# Patient Record
Sex: MALE | Race: WHITE | Employment: OTHER | URBAN - METROPOLITAN AREA
[De-identification: names, ages, dates, MRNs, and addresses within clinical notes are randomized per-mention and may not be internally consistent; named-entity substitution may affect disease eponyms.]

---

## 2018-12-18 ENCOUNTER — HOSPITAL ENCOUNTER (OUTPATIENT)
Dept: CARDIAC REHAB | Age: 68
Setting detail: THERAPIES SERIES
Discharge: HOME | End: 2018-12-18
Payer: OTHER GOVERNMENT

## 2019-06-04 ENCOUNTER — HOSPITAL ENCOUNTER (OUTPATIENT)
Dept: CARDIAC REHAB | Age: 69
Setting detail: THERAPIES SERIES
Discharge: HOME | End: 2019-06-04
Payer: OTHER GOVERNMENT

## 2019-06-06 ENCOUNTER — HOSPITAL ENCOUNTER (OUTPATIENT)
Dept: CARDIAC REHAB | Age: 69
Setting detail: THERAPIES SERIES
Discharge: HOME | End: 2019-06-06
Payer: OTHER GOVERNMENT

## 2019-06-06 PROCEDURE — G0424 PULMONARY REHAB W EXER: HCPCS

## 2019-06-11 ENCOUNTER — HOSPITAL ENCOUNTER (OUTPATIENT)
Dept: CARDIAC REHAB | Age: 69
Setting detail: THERAPIES SERIES
Discharge: HOME | End: 2019-06-11
Payer: OTHER GOVERNMENT

## 2019-06-11 PROCEDURE — G0424 PULMONARY REHAB W EXER: HCPCS

## 2019-06-13 ENCOUNTER — HOSPITAL ENCOUNTER (OUTPATIENT)
Dept: CARDIAC REHAB | Age: 69
Setting detail: THERAPIES SERIES
Discharge: HOME | End: 2019-06-13
Payer: OTHER GOVERNMENT

## 2019-06-13 PROCEDURE — G0424 PULMONARY REHAB W EXER: HCPCS

## 2019-06-18 ENCOUNTER — HOSPITAL ENCOUNTER (OUTPATIENT)
Dept: CARDIAC REHAB | Age: 69
Setting detail: THERAPIES SERIES
End: 2019-06-18
Payer: OTHER GOVERNMENT

## 2019-06-20 ENCOUNTER — HOSPITAL ENCOUNTER (OUTPATIENT)
Dept: CARDIAC REHAB | Age: 69
Setting detail: THERAPIES SERIES
Discharge: HOME | End: 2019-06-20
Payer: OTHER GOVERNMENT

## 2019-06-20 PROCEDURE — G0424 PULMONARY REHAB W EXER: HCPCS

## 2019-06-25 ENCOUNTER — HOSPITAL ENCOUNTER (OUTPATIENT)
Dept: CARDIAC REHAB | Age: 69
Setting detail: THERAPIES SERIES
Discharge: HOME | End: 2019-06-25
Payer: OTHER GOVERNMENT

## 2019-06-25 PROCEDURE — G0424 PULMONARY REHAB W EXER: HCPCS

## 2019-07-01 ENCOUNTER — APPOINTMENT (OUTPATIENT)
Dept: CT IMAGING | Age: 69
DRG: 439 | End: 2019-07-01
Payer: OTHER GOVERNMENT

## 2019-07-01 ENCOUNTER — HOSPITAL ENCOUNTER (INPATIENT)
Age: 69
LOS: 9 days | Discharge: ANOTHER HOSPITAL | DRG: 439 | End: 2019-07-10
Attending: EMERGENCY MEDICINE | Admitting: INTERNAL MEDICINE
Payer: OTHER GOVERNMENT

## 2019-07-01 DIAGNOSIS — K52.9 COLITIS: ICD-10-CM

## 2019-07-01 DIAGNOSIS — K29.80 DUODENITIS: ICD-10-CM

## 2019-07-01 DIAGNOSIS — K85.90 ACUTE PANCREATITIS, UNSPECIFIED COMPLICATION STATUS, UNSPECIFIED PANCREATITIS TYPE: Primary | ICD-10-CM

## 2019-07-01 PROBLEM — K85.91 ACUTE NECROTIZING PANCREATITIS: Status: ACTIVE | Noted: 2019-07-01

## 2019-07-01 LAB
ALBUMIN SERPL-MCNC: 3.8 G/DL (ref 3.5–5.2)
ALP BLD-CCNC: 85 U/L (ref 40–129)
ALT SERPL-CCNC: 34 U/L (ref 0–40)
ANION GAP SERPL CALCULATED.3IONS-SCNC: 14 MMOL/L (ref 7–16)
AST SERPL-CCNC: 81 U/L (ref 0–39)
BASOPHILS ABSOLUTE: 0 E9/L (ref 0–0.2)
BASOPHILS RELATIVE PERCENT: 0.2 % (ref 0–2)
BILIRUB SERPL-MCNC: 1.6 MG/DL (ref 0–1.2)
BUN BLDV-MCNC: 12 MG/DL (ref 8–23)
BURR CELLS: ABNORMAL
CALCIUM SERPL-MCNC: 10 MG/DL (ref 8.6–10.2)
CHLORIDE BLD-SCNC: 92 MMOL/L (ref 98–107)
CO2: 25 MMOL/L (ref 22–29)
CREAT SERPL-MCNC: 0.7 MG/DL (ref 0.7–1.2)
EOSINOPHILS ABSOLUTE: 0 E9/L (ref 0.05–0.5)
EOSINOPHILS RELATIVE PERCENT: 0.9 % (ref 0–6)
GFR AFRICAN AMERICAN: >60
GFR NON-AFRICAN AMERICAN: >60 ML/MIN/1.73
GLUCOSE BLD-MCNC: 112 MG/DL (ref 74–99)
HCT VFR BLD CALC: 42.3 % (ref 37–54)
HEMOGLOBIN: 13.6 G/DL (ref 12.5–16.5)
LACTIC ACID: 1.2 MMOL/L (ref 0.5–2.2)
LIPASE: 192 U/L (ref 13–60)
LYMPHOCYTES ABSOLUTE: 0.7 E9/L (ref 1.5–4)
LYMPHOCYTES RELATIVE PERCENT: 4.3 % (ref 20–42)
MCH RBC QN AUTO: 27.9 PG (ref 26–35)
MCHC RBC AUTO-ENTMCNC: 32.2 % (ref 32–34.5)
MCV RBC AUTO: 86.9 FL (ref 80–99.9)
MONOCYTES ABSOLUTE: 1.76 E9/L (ref 0.1–0.95)
MONOCYTES RELATIVE PERCENT: 9.6 % (ref 2–12)
NEUTROPHILS ABSOLUTE: 15.14 E9/L (ref 1.8–7.3)
NEUTROPHILS RELATIVE PERCENT: 86.1 % (ref 43–80)
OVALOCYTES: ABNORMAL
PDW BLD-RTO: 15.4 FL (ref 11.5–15)
PLATELET # BLD: 178 E9/L (ref 130–450)
PMV BLD AUTO: 9.7 FL (ref 7–12)
POIKILOCYTES: ABNORMAL
POTASSIUM REFLEX MAGNESIUM: 3.6 MMOL/L (ref 3.5–5)
RBC # BLD: 4.87 E12/L (ref 3.8–5.8)
SCHISTOCYTES: ABNORMAL
SODIUM BLD-SCNC: 131 MMOL/L (ref 132–146)
TEAR DROP CELLS: ABNORMAL
TOTAL PROTEIN: 7.6 G/DL (ref 6.4–8.3)
TROPONIN: <0.01 NG/ML (ref 0–0.03)
WBC # BLD: 17.6 E9/L (ref 4.5–11.5)

## 2019-07-01 PROCEDURE — 2060000000 HC ICU INTERMEDIATE R&B

## 2019-07-01 PROCEDURE — 99285 EMERGENCY DEPT VISIT HI MDM: CPT

## 2019-07-01 PROCEDURE — 6360000002 HC RX W HCPCS: Performed by: INTERNAL MEDICINE

## 2019-07-01 PROCEDURE — C9113 INJ PANTOPRAZOLE SODIUM, VIA: HCPCS | Performed by: INTERNAL MEDICINE

## 2019-07-01 PROCEDURE — 84484 ASSAY OF TROPONIN QUANT: CPT

## 2019-07-01 PROCEDURE — 96375 TX/PRO/DX INJ NEW DRUG ADDON: CPT

## 2019-07-01 PROCEDURE — 6360000002 HC RX W HCPCS: Performed by: EMERGENCY MEDICINE

## 2019-07-01 PROCEDURE — 6360000004 HC RX CONTRAST MEDICATION: Performed by: RADIOLOGY

## 2019-07-01 PROCEDURE — 94640 AIRWAY INHALATION TREATMENT: CPT

## 2019-07-01 PROCEDURE — 6370000000 HC RX 637 (ALT 250 FOR IP): Performed by: INTERNAL MEDICINE

## 2019-07-01 PROCEDURE — 83605 ASSAY OF LACTIC ACID: CPT

## 2019-07-01 PROCEDURE — 74177 CT ABD & PELVIS W/CONTRAST: CPT

## 2019-07-01 PROCEDURE — 96374 THER/PROPH/DIAG INJ IV PUSH: CPT

## 2019-07-01 PROCEDURE — 80053 COMPREHEN METABOLIC PANEL: CPT

## 2019-07-01 PROCEDURE — 36415 COLL VENOUS BLD VENIPUNCTURE: CPT

## 2019-07-01 PROCEDURE — 83690 ASSAY OF LIPASE: CPT

## 2019-07-01 PROCEDURE — 85025 COMPLETE CBC W/AUTO DIFF WBC: CPT

## 2019-07-01 PROCEDURE — 2580000003 HC RX 258: Performed by: EMERGENCY MEDICINE

## 2019-07-01 RX ORDER — ASPIRIN 81 MG/1
81 TABLET, CHEWABLE ORAL DAILY
Status: DISCONTINUED | OUTPATIENT
Start: 2019-07-02 | End: 2019-07-10 | Stop reason: HOSPADM

## 2019-07-01 RX ORDER — KETOROLAC TROMETHAMINE 15 MG/ML
15 INJECTION, SOLUTION INTRAMUSCULAR; INTRAVENOUS ONCE
Status: COMPLETED | OUTPATIENT
Start: 2019-07-01 | End: 2019-07-01

## 2019-07-01 RX ORDER — FENOFIBRATE 160 MG/1
160 TABLET ORAL DAILY
Status: DISCONTINUED | OUTPATIENT
Start: 2019-07-02 | End: 2019-07-10 | Stop reason: HOSPADM

## 2019-07-01 RX ORDER — ACETAMINOPHEN 325 MG/1
650 TABLET ORAL 4 TIMES DAILY PRN
COMMUNITY
Start: 2019-03-07

## 2019-07-01 RX ORDER — CHOLESTYRAMINE LIGHT 4 G/5.7G
4 POWDER, FOR SUSPENSION ORAL DAILY
Status: DISCONTINUED | OUTPATIENT
Start: 2019-07-02 | End: 2019-07-10 | Stop reason: HOSPADM

## 2019-07-01 RX ORDER — MORPHINE SULFATE 10 MG/ML
8 INJECTION, SOLUTION INTRAMUSCULAR; INTRAVENOUS ONCE
Status: COMPLETED | OUTPATIENT
Start: 2019-07-01 | End: 2019-07-01

## 2019-07-01 RX ORDER — SODIUM CHLORIDE AND POTASSIUM CHLORIDE .9; .15 G/100ML; G/100ML
SOLUTION INTRAVENOUS CONTINUOUS
Status: DISCONTINUED | OUTPATIENT
Start: 2019-07-01 | End: 2019-07-10 | Stop reason: HOSPADM

## 2019-07-01 RX ORDER — BUDESONIDE AND FORMOTEROL FUMARATE DIHYDRATE 160; 4.5 UG/1; UG/1
2 AEROSOL RESPIRATORY (INHALATION) 2 TIMES DAILY
COMMUNITY
Start: 2019-03-07

## 2019-07-01 RX ORDER — CARVEDILOL 25 MG/1
25 TABLET ORAL 2 TIMES DAILY WITH MEALS
Status: DISCONTINUED | OUTPATIENT
Start: 2019-07-02 | End: 2019-07-10 | Stop reason: HOSPADM

## 2019-07-01 RX ORDER — IPRATROPIUM BROMIDE AND ALBUTEROL SULFATE 2.5; .5 MG/3ML; MG/3ML
1 SOLUTION RESPIRATORY (INHALATION)
Status: DISCONTINUED | OUTPATIENT
Start: 2019-07-02 | End: 2019-07-10 | Stop reason: HOSPADM

## 2019-07-01 RX ORDER — MORPHINE SULFATE 4 MG/ML
4 INJECTION, SOLUTION INTRAMUSCULAR; INTRAVENOUS EVERY 4 HOURS PRN
Status: DISCONTINUED | OUTPATIENT
Start: 2019-07-01 | End: 2019-07-10 | Stop reason: HOSPADM

## 2019-07-01 RX ORDER — PANTOPRAZOLE SODIUM 40 MG/10ML
40 INJECTION, POWDER, LYOPHILIZED, FOR SOLUTION INTRAVENOUS 2 TIMES DAILY
Status: DISCONTINUED | OUTPATIENT
Start: 2019-07-01 | End: 2019-07-09

## 2019-07-01 RX ORDER — ONDANSETRON 2 MG/ML
4 INJECTION INTRAMUSCULAR; INTRAVENOUS ONCE
Status: COMPLETED | OUTPATIENT
Start: 2019-07-01 | End: 2019-07-01

## 2019-07-01 RX ORDER — PRAVASTATIN SODIUM 20 MG
80 TABLET ORAL DAILY
Status: DISCONTINUED | OUTPATIENT
Start: 2019-07-02 | End: 2019-07-08

## 2019-07-01 RX ORDER — ONDANSETRON 2 MG/ML
4 INJECTION INTRAMUSCULAR; INTRAVENOUS EVERY 6 HOURS PRN
Status: DISCONTINUED | OUTPATIENT
Start: 2019-07-01 | End: 2019-07-10 | Stop reason: HOSPADM

## 2019-07-01 RX ORDER — 0.9 % SODIUM CHLORIDE 0.9 %
1000 INTRAVENOUS SOLUTION INTRAVENOUS ONCE
Status: COMPLETED | OUTPATIENT
Start: 2019-07-01 | End: 2019-07-01

## 2019-07-01 RX ORDER — M-VIT,TX,IRON,MINS/CALC/FOLIC 27MG-0.4MG
1 TABLET ORAL DAILY
Status: DISCONTINUED | OUTPATIENT
Start: 2019-07-02 | End: 2019-07-10 | Stop reason: HOSPADM

## 2019-07-01 RX ORDER — TRAMADOL HYDROCHLORIDE 50 MG/1
50 TABLET ORAL 2 TIMES DAILY
Status: DISCONTINUED | OUTPATIENT
Start: 2019-07-01 | End: 2019-07-10 | Stop reason: HOSPADM

## 2019-07-01 RX ADMIN — IOHEXOL 50 ML: 240 INJECTION, SOLUTION INTRATHECAL; INTRAVASCULAR; INTRAVENOUS; ORAL at 20:36

## 2019-07-01 RX ADMIN — MORPHINE SULFATE 8 MG: 10 INJECTION INTRAVENOUS at 21:08

## 2019-07-01 RX ADMIN — PANTOPRAZOLE SODIUM 40 MG: 40 INJECTION, POWDER, FOR SOLUTION INTRAVENOUS at 23:35

## 2019-07-01 RX ADMIN — KETOROLAC TROMETHAMINE 15 MG: 15 INJECTION, SOLUTION INTRAMUSCULAR; INTRAVENOUS at 18:54

## 2019-07-01 RX ADMIN — PIPERACILLIN SODIUM AND TAZOBACTAM SODIUM 3.38 G: 3; .375 INJECTION, POWDER, LYOPHILIZED, FOR SOLUTION INTRAVENOUS at 21:43

## 2019-07-01 RX ADMIN — ONDANSETRON 4 MG: 2 INJECTION INTRAMUSCULAR; INTRAVENOUS at 18:55

## 2019-07-01 RX ADMIN — IPRATROPIUM BROMIDE AND ALBUTEROL SULFATE 1 AMPULE: .5; 3 SOLUTION RESPIRATORY (INHALATION) at 22:57

## 2019-07-01 RX ADMIN — IOPAMIDOL 80 ML: 755 INJECTION, SOLUTION INTRAVENOUS at 20:36

## 2019-07-01 RX ADMIN — POTASSIUM CHLORIDE AND SODIUM CHLORIDE: 900; 150 INJECTION, SOLUTION INTRAVENOUS at 23:35

## 2019-07-01 RX ADMIN — SODIUM CHLORIDE 1000 ML: 900 INJECTION, SOLUTION INTRAVENOUS at 18:57

## 2019-07-01 ASSESSMENT — PAIN DESCRIPTION - DESCRIPTORS: DESCRIPTORS: THROBBING;PRESSURE

## 2019-07-01 ASSESSMENT — PAIN SCALES - GENERAL
PAINLEVEL_OUTOF10: 10
PAINLEVEL_OUTOF10: 0
PAINLEVEL_OUTOF10: 9
PAINLEVEL_OUTOF10: 0
PAINLEVEL_OUTOF10: 7

## 2019-07-01 ASSESSMENT — ENCOUNTER SYMPTOMS
ABDOMINAL DISTENTION: 1
BLOOD IN STOOL: 0
NAUSEA: 1
DIARRHEA: 1
BACK PAIN: 0
ABDOMINAL PAIN: 1
SHORTNESS OF BREATH: 0
VOMITING: 1
CONSTIPATION: 1

## 2019-07-01 ASSESSMENT — PAIN DESCRIPTION - LOCATION: LOCATION: ABDOMEN

## 2019-07-01 ASSESSMENT — PAIN DESCRIPTION - PAIN TYPE: TYPE: ACUTE PAIN

## 2019-07-01 NOTE — ED PROVIDER NOTES
0.50 E9/L    Basophils # 0.00 0.00 - 0.20 E9/L    Poikilocytes 2+     Schistocytes 1+     Palmetto Cells 2+     Ovalocytes 1+     Tear Drop Cells 1+    Comprehensive Metabolic Panel w/ Reflex to MG   Result Value Ref Range    Sodium 131 (L) 132 - 146 mmol/L    Potassium reflex Magnesium 3.6 3.5 - 5.0 mmol/L    Chloride 92 (L) 98 - 107 mmol/L    CO2 25 22 - 29 mmol/L    Anion Gap 14 7 - 16 mmol/L    Glucose 112 (H) 74 - 99 mg/dL    BUN 12 8 - 23 mg/dL    CREATININE 0.7 0.7 - 1.2 mg/dL    GFR Non-African American >60 >=60 mL/min/1.73    GFR African American >60     Calcium 10.0 8.6 - 10.2 mg/dL    Total Protein 7.6 6.4 - 8.3 g/dL    Alb 3.8 3.5 - 5.2 g/dL    Total Bilirubin 1.6 (H) 0.0 - 1.2 mg/dL    Alkaline Phosphatase 85 40 - 129 U/L    ALT 34 0 - 40 U/L    AST 81 (H) 0 - 39 U/L   Lipase   Result Value Ref Range    Lipase 192 (H) 13 - 60 U/L   Lactic Acid, Plasma   Result Value Ref Range    Lactic Acid 1.2 0.5 - 2.2 mmol/L   Troponin   Result Value Ref Range    Troponin <0.01 0.00 - 0.03 ng/mL       RADIOLOGY:  CT ABDOMEN PELVIS W IV CONTRAST Additional Contrast? Oral   Final Result   Extensive inflammatory process in the upper abdomen. Differential includes pancreatitis and duodenitis. Air bubbles within   the region of the abnormal appearing duodenum could be intraluminal or   intramural in location. Inflammatory process is also associated with   some mural thickening of the ascending portion of the colon. ------------------------- NURSING NOTES AND VITALS REVIEWED ---------------------------  Date / Time Roomed:  7/1/2019  5:52 PM  ED Bed Assignment:  [id number]    The nursing notes within the ED encounter and vital signs as below have been reviewed.      Patient Vitals for the past 24 hrs:   BP Temp Temp src Pulse Resp SpO2 Height Weight   07/01/19 2239 123/67 99 °F (37.2 °C) Oral 84 20 92 % -- --   07/01/19 2200 96/63 98.2 °F (36.8 °C) Oral 84 20 92 % -- --   07/01/19 2000 112/72 -- -- 78 20 93 % --

## 2019-07-02 LAB
ALBUMIN SERPL-MCNC: 3.3 G/DL (ref 3.5–5.2)
ALP BLD-CCNC: 83 U/L (ref 40–129)
ALT SERPL-CCNC: 35 U/L (ref 0–40)
ANION GAP SERPL CALCULATED.3IONS-SCNC: 12 MMOL/L (ref 7–16)
ANISOCYTOSIS: ABNORMAL
AST SERPL-CCNC: 72 U/L (ref 0–39)
BASOPHILS ABSOLUTE: 0 E9/L (ref 0–0.2)
BASOPHILS RELATIVE PERCENT: 0.3 % (ref 0–2)
BILIRUB SERPL-MCNC: 1.3 MG/DL (ref 0–1.2)
BUN BLDV-MCNC: 13 MG/DL (ref 8–23)
BURR CELLS: ABNORMAL
CALCIUM SERPL-MCNC: 9 MG/DL (ref 8.6–10.2)
CHLORIDE BLD-SCNC: 98 MMOL/L (ref 98–107)
CHOLESTEROL, TOTAL: 120 MG/DL (ref 0–199)
CO2: 23 MMOL/L (ref 22–29)
CREAT SERPL-MCNC: 0.8 MG/DL (ref 0.7–1.2)
EOSINOPHILS ABSOLUTE: 0 E9/L (ref 0.05–0.5)
EOSINOPHILS RELATIVE PERCENT: 1.5 % (ref 0–6)
GFR AFRICAN AMERICAN: >60
GFR NON-AFRICAN AMERICAN: >60 ML/MIN/1.73
GLUCOSE BLD-MCNC: 81 MG/DL (ref 74–99)
HCT VFR BLD CALC: 36.4 % (ref 37–54)
HDLC SERPL-MCNC: 25 MG/DL
HEMOGLOBIN: 11.7 G/DL (ref 12.5–16.5)
LDL CHOLESTEROL CALCULATED: 72 MG/DL (ref 0–99)
LYMPHOCYTES ABSOLUTE: 0.29 E9/L (ref 1.5–4)
LYMPHOCYTES RELATIVE PERCENT: 1.7 % (ref 20–42)
MAGNESIUM: 1.7 MG/DL (ref 1.6–2.6)
MCH RBC QN AUTO: 28.1 PG (ref 26–35)
MCHC RBC AUTO-ENTMCNC: 32.1 % (ref 32–34.5)
MCV RBC AUTO: 87.5 FL (ref 80–99.9)
MONOCYTES ABSOLUTE: 1.47 E9/L (ref 0.1–0.95)
MONOCYTES RELATIVE PERCENT: 9.6 % (ref 2–12)
NEUTROPHILS ABSOLUTE: 13.08 E9/L (ref 1.8–7.3)
NEUTROPHILS RELATIVE PERCENT: 88.7 % (ref 43–80)
OVALOCYTES: ABNORMAL
PDW BLD-RTO: 15.3 FL (ref 11.5–15)
PHOSPHORUS: 2.5 MG/DL (ref 2.5–4.5)
PLATELET # BLD: 162 E9/L (ref 130–450)
PMV BLD AUTO: 9.6 FL (ref 7–12)
POIKILOCYTES: ABNORMAL
POLYCHROMASIA: ABNORMAL
POTASSIUM SERPL-SCNC: 3.6 MMOL/L (ref 3.5–5)
RBC # BLD: 4.16 E12/L (ref 3.8–5.8)
SODIUM BLD-SCNC: 133 MMOL/L (ref 132–146)
TEAR DROP CELLS: ABNORMAL
TOTAL PROTEIN: 6.7 G/DL (ref 6.4–8.3)
TRIGL SERPL-MCNC: 116 MG/DL (ref 0–149)
TSH SERPL DL<=0.05 MIU/L-ACNC: 1.83 UIU/ML (ref 0.27–4.2)
VLDLC SERPL CALC-MCNC: 23 MG/DL
WBC # BLD: 14.7 E9/L (ref 4.5–11.5)

## 2019-07-02 PROCEDURE — 84100 ASSAY OF PHOSPHORUS: CPT

## 2019-07-02 PROCEDURE — 80053 COMPREHEN METABOLIC PANEL: CPT

## 2019-07-02 PROCEDURE — 36415 COLL VENOUS BLD VENIPUNCTURE: CPT

## 2019-07-02 PROCEDURE — 83735 ASSAY OF MAGNESIUM: CPT

## 2019-07-02 PROCEDURE — 2060000000 HC ICU INTERMEDIATE R&B

## 2019-07-02 PROCEDURE — 80061 LIPID PANEL: CPT

## 2019-07-02 PROCEDURE — 94640 AIRWAY INHALATION TREATMENT: CPT

## 2019-07-02 PROCEDURE — 84443 ASSAY THYROID STIM HORMONE: CPT

## 2019-07-02 PROCEDURE — 6370000000 HC RX 637 (ALT 250 FOR IP): Performed by: INTERNAL MEDICINE

## 2019-07-02 PROCEDURE — C9113 INJ PANTOPRAZOLE SODIUM, VIA: HCPCS | Performed by: INTERNAL MEDICINE

## 2019-07-02 PROCEDURE — 85025 COMPLETE CBC W/AUTO DIFF WBC: CPT

## 2019-07-02 PROCEDURE — 2700000000 HC OXYGEN THERAPY PER DAY

## 2019-07-02 PROCEDURE — 6360000002 HC RX W HCPCS: Performed by: INTERNAL MEDICINE

## 2019-07-02 RX ORDER — ACETAMINOPHEN 500 MG
500 TABLET ORAL EVERY 6 HOURS PRN
Status: DISCONTINUED | OUTPATIENT
Start: 2019-07-02 | End: 2019-07-06

## 2019-07-02 RX ADMIN — ONDANSETRON 4 MG: 2 INJECTION INTRAMUSCULAR; INTRAVENOUS at 22:11

## 2019-07-02 RX ADMIN — MORPHINE SULFATE 4 MG: 4 INJECTION INTRAVENOUS at 00:20

## 2019-07-02 RX ADMIN — PANTOPRAZOLE SODIUM 40 MG: 40 INJECTION, POWDER, FOR SOLUTION INTRAVENOUS at 20:26

## 2019-07-02 RX ADMIN — IPRATROPIUM BROMIDE AND ALBUTEROL SULFATE 1 AMPULE: .5; 3 SOLUTION RESPIRATORY (INHALATION) at 06:18

## 2019-07-02 RX ADMIN — IPRATROPIUM BROMIDE AND ALBUTEROL SULFATE 1 AMPULE: .5; 3 SOLUTION RESPIRATORY (INHALATION) at 09:19

## 2019-07-02 RX ADMIN — POTASSIUM CHLORIDE AND SODIUM CHLORIDE: 900; 150 INJECTION, SOLUTION INTRAVENOUS at 09:27

## 2019-07-02 RX ADMIN — IPRATROPIUM BROMIDE AND ALBUTEROL SULFATE 1 AMPULE: .5; 3 SOLUTION RESPIRATORY (INHALATION) at 18:07

## 2019-07-02 RX ADMIN — ONDANSETRON 4 MG: 2 INJECTION INTRAMUSCULAR; INTRAVENOUS at 09:27

## 2019-07-02 RX ADMIN — ONDANSETRON 4 MG: 2 INJECTION INTRAMUSCULAR; INTRAVENOUS at 03:01

## 2019-07-02 RX ADMIN — IPRATROPIUM BROMIDE AND ALBUTEROL SULFATE 1 AMPULE: .5; 3 SOLUTION RESPIRATORY (INHALATION) at 13:24

## 2019-07-02 RX ADMIN — POTASSIUM CHLORIDE AND SODIUM CHLORIDE: 900; 150 INJECTION, SOLUTION INTRAVENOUS at 18:17

## 2019-07-02 RX ADMIN — MORPHINE SULFATE 4 MG: 4 INJECTION INTRAVENOUS at 04:53

## 2019-07-02 RX ADMIN — MORPHINE SULFATE 4 MG: 4 INJECTION INTRAVENOUS at 20:26

## 2019-07-02 RX ADMIN — ONDANSETRON 4 MG: 2 INJECTION INTRAMUSCULAR; INTRAVENOUS at 15:51

## 2019-07-02 RX ADMIN — PANTOPRAZOLE SODIUM 40 MG: 40 INJECTION, POWDER, FOR SOLUTION INTRAVENOUS at 09:27

## 2019-07-02 RX ADMIN — MORPHINE SULFATE 4 MG: 4 INJECTION INTRAVENOUS at 15:51

## 2019-07-02 RX ADMIN — MORPHINE SULFATE 4 MG: 4 INJECTION INTRAVENOUS at 09:27

## 2019-07-02 ASSESSMENT — PAIN DESCRIPTION - PAIN TYPE
TYPE: ACUTE PAIN

## 2019-07-02 ASSESSMENT — PAIN SCALES - GENERAL
PAINLEVEL_OUTOF10: 8
PAINLEVEL_OUTOF10: 8
PAINLEVEL_OUTOF10: 9
PAINLEVEL_OUTOF10: 8
PAINLEVEL_OUTOF10: 9
PAINLEVEL_OUTOF10: 0

## 2019-07-02 ASSESSMENT — PAIN DESCRIPTION - ORIENTATION: ORIENTATION: UPPER

## 2019-07-02 ASSESSMENT — PAIN DESCRIPTION - DESCRIPTORS
DESCRIPTORS: DISCOMFORT;THROBBING
DESCRIPTORS: PENETRATING;DISCOMFORT
DESCRIPTORS: ACHING;DISCOMFORT

## 2019-07-02 ASSESSMENT — PAIN DESCRIPTION - LOCATION
LOCATION: ABDOMEN;BACK
LOCATION: ABDOMEN
LOCATION: ABDOMEN
LOCATION: ABDOMEN;BACK
LOCATION: ABDOMEN

## 2019-07-02 ASSESSMENT — PAIN - FUNCTIONAL ASSESSMENT
PAIN_FUNCTIONAL_ASSESSMENT: PREVENTS OR INTERFERES SOME ACTIVE ACTIVITIES AND ADLS
PAIN_FUNCTIONAL_ASSESSMENT: ACTIVITIES ARE NOT PREVENTED
PAIN_FUNCTIONAL_ASSESSMENT: ACTIVITIES ARE NOT PREVENTED

## 2019-07-02 NOTE — PROGRESS NOTES
Perfect served Aleshia Bolanos because he is on call for Manny Kern about the new consult. Aleshia Bolanos did read the message.

## 2019-07-02 NOTE — H&P
on phone: Not on file     Gets together: Not on file     Attends Sabianism service: Not on file     Active member of club or organization: Not on file     Attends meetings of clubs or organizations: Not on file     Relationship status: Not on file    Intimate partner violence:     Fear of current or ex partner: Not on file     Emotionally abused: Not on file     Physically abused: Not on file     Forced sexual activity: Not on file   Other Topics Concern    Not on file   Social History Narrative    Not on file       Family History:   No family history on file. REVIEW OF SYSTEMS:    Gen: Patient denies any lightheadedness or dizziness. No LOC or syncope. No fevers or chills. HEENT: No earache, sore throat or nasal congestion. Resp: Denies cough, hemoptysis or sputum production. Cardiac: Denies chest pain, SOB, diaphoresis or palpitations. GI: + Upper abdominal pain, nausea, vomiting, diarrhea, constipation. No melena or hematochezia. : No urinary complaints, dysuria, hematuria or frequency. MSK: No extremity weakness, paralysis or paresthesias. PHYSICAL EXAM:    Vitals:  /68   Pulse 91   Temp 98.6 °F (37 °C) (Axillary)   Resp 18   Ht 5' 11\" (1.803 m)   Wt 207 lb 6 oz (94.1 kg)   SpO2 96%   BMI 28.92 kg/m²     General:  This is a 76 y.o. yo male who is alert and oriented in NAD  HEENT:  Head is normocephalic and atraumatic, PERRLA, EOMI, mucus membranes moist with no pharyngeal erythema or exudate. Neck:  Supple with no carotid bruits, JVD or thyromegaly.   No cervical adenopathy  CV:  Regular rate and rhythm, no murmurs  Lungs: Coarse decreased breath sound with mild expiratory wheeze to auscultation bilaterally with no wheezes, rales or rhonchi  Abdomen:  Soft, + mid upper abdomen tender, only distended, bowel sounds present, no rebound tenderness  Extremities:  No edema, peripheral pulses intact bilaterally  Neuro:  Cranial nerves II-XII grossly intact; motor and sensory function intact with no focal deficits  Skin:  No rashes, lesions or wounds    DATA:  CBC with Differential:    Lab Results   Component Value Date    WBC 14.7 07/02/2019    RBC 4.16 07/02/2019    HGB 11.7 07/02/2019    HCT 36.4 07/02/2019     07/02/2019    MCV 87.5 07/02/2019    MCH 28.1 07/02/2019    MCHC 32.1 07/02/2019    RDW 15.3 07/02/2019    LYMPHOPCT 1.7 07/02/2019    MONOPCT 9.6 07/02/2019    BASOPCT 0.3 07/02/2019    MONOSABS 1.47 07/02/2019    LYMPHSABS 0.29 07/02/2019    EOSABS 0.00 07/02/2019    BASOSABS 0.00 07/02/2019     CMP:    Lab Results   Component Value Date     07/02/2019    K 3.6 07/02/2019    K 3.6 07/01/2019    CL 98 07/02/2019    CO2 23 07/02/2019    BUN 13 07/02/2019    CREATININE 0.8 07/02/2019    GFRAA >60 07/02/2019    LABGLOM >60 07/02/2019    GLUCOSE 81 07/02/2019    PROT 6.7 07/02/2019    LABALBU 3.3 07/02/2019    CALCIUM 9.0 07/02/2019    BILITOT 1.3 07/02/2019    ALKPHOS 83 07/02/2019    AST 72 07/02/2019    ALT 35 07/02/2019     Magnesium:    Lab Results   Component Value Date    MG 1.7 07/02/2019     Phosphorus:    Lab Results   Component Value Date    PHOS 2.5 07/02/2019     PT/INR:  No results found for: PROTIME, INR  Troponin:    Lab Results   Component Value Date    TROPONINI <0.01 07/01/2019     U/A:  No results found for: NITRITE, COLORU, PROTEINU, PHUR, LABCAST, WBCUA, RBCUA, MUCUS, TRICHOMONAS, YEAST, BACTERIA, CLARITYU, SPECGRAV, LEUKOCYTESUR, UROBILINOGEN, BILIRUBINUR, BLOODU, GLUCOSEU, AMORPHOUS  ABG:  No results found for: PH, PCO2, PO2, HCO3, BE, THGB, TCO2, O2SAT  HgBA1c:  No results found for: LABA1C  FLP:    Lab Results   Component Value Date    TRIG 116 07/02/2019    HDL 25 07/02/2019    LDLCALC 72 07/02/2019    LABVLDL 23 07/02/2019     TSH:    Lab Results   Component Value Date    TSH 1.830 07/02/2019     IRON:  No results found for: IRON  LIPASE:    Lab Results   Component Value Date    LIPASE 192 07/01/2019       ASSESSMENT AND PLAN: Patient Active Problem List    Diagnosis Date Noted    Acute pancreatitis 07/01/2019    Combined 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 except for ice chips and medication  Consult general surgery  MRCP if no improvement  Lipase, CMP and CBC in a.m.          Anushka Almendarez D.O.  7/2/2019  9:51 AM

## 2019-07-02 NOTE — CARE COORDINATION
Patient is a - per [facility] he is service connected and has travel benefits. Await return call from [facility] to determine if they would be able to take the patient and if a bed is available. Spoke with the patient and he is willing to transfer to [address] [place] [address] if possible and if a bed is available; he especially would rather transfer if he would need a lot more testing, treatment, or surgery.

## 2019-07-02 NOTE — ED NOTES
Nurse to nurse given to [facility] from [facility], the floor will be down shortly to get the patient.      Karl Mejia RN  07/01/19 4538

## 2019-07-03 ENCOUNTER — APPOINTMENT (OUTPATIENT)
Dept: MRI IMAGING | Age: 69
DRG: 439 | End: 2019-07-03
Payer: OTHER GOVERNMENT

## 2019-07-03 LAB
ALBUMIN SERPL-MCNC: 2.8 G/DL (ref 3.5–5.2)
ALP BLD-CCNC: 103 U/L (ref 40–129)
ALT SERPL-CCNC: 35 U/L (ref 0–40)
ANION GAP SERPL CALCULATED.3IONS-SCNC: 15 MMOL/L (ref 7–16)
AST SERPL-CCNC: 72 U/L (ref 0–39)
BASOPHILS ABSOLUTE: 0 E9/L (ref 0–0.2)
BASOPHILS RELATIVE PERCENT: 0 % (ref 0–2)
BILIRUB SERPL-MCNC: 1.2 MG/DL (ref 0–1.2)
BUN BLDV-MCNC: 9 MG/DL (ref 8–23)
CALCIUM SERPL-MCNC: 9.2 MG/DL (ref 8.6–10.2)
CHLORIDE BLD-SCNC: 97 MMOL/L (ref 98–107)
CO2: 22 MMOL/L (ref 22–29)
CREAT SERPL-MCNC: 0.7 MG/DL (ref 0.7–1.2)
EOSINOPHILS ABSOLUTE: 0.17 E9/L (ref 0.05–0.5)
EOSINOPHILS RELATIVE PERCENT: 1 % (ref 0–6)
GFR AFRICAN AMERICAN: >60
GFR NON-AFRICAN AMERICAN: >60 ML/MIN/1.73
GLUCOSE BLD-MCNC: 70 MG/DL (ref 74–99)
HCT VFR BLD CALC: 38.5 % (ref 37–54)
HEMOGLOBIN: 11.7 G/DL (ref 12.5–16.5)
LIPASE: 84 U/L (ref 13–60)
LYMPHOCYTES ABSOLUTE: 0.52 E9/L (ref 1.5–4)
LYMPHOCYTES RELATIVE PERCENT: 3 % (ref 20–42)
MCH RBC QN AUTO: 27.9 PG (ref 26–35)
MCHC RBC AUTO-ENTMCNC: 30.4 % (ref 32–34.5)
MCV RBC AUTO: 91.9 FL (ref 80–99.9)
MONOCYTES ABSOLUTE: 1.56 E9/L (ref 0.1–0.95)
MONOCYTES RELATIVE PERCENT: 9 % (ref 2–12)
MYELOCYTE PERCENT: 1 % (ref 0–0)
NEUTROPHILS ABSOLUTE: 15.05 E9/L (ref 1.8–7.3)
NEUTROPHILS RELATIVE PERCENT: 86 % (ref 43–80)
PDW BLD-RTO: 15.3 FL (ref 11.5–15)
PLATELET # BLD: 206 E9/L (ref 130–450)
PMV BLD AUTO: 9.8 FL (ref 7–12)
POIKILOCYTES: ABNORMAL
POTASSIUM SERPL-SCNC: 4.5 MMOL/L (ref 3.5–5)
RBC # BLD: 4.19 E12/L (ref 3.8–5.8)
SODIUM BLD-SCNC: 134 MMOL/L (ref 132–146)
TEAR DROP CELLS: ABNORMAL
TOTAL PROTEIN: 6.7 G/DL (ref 6.4–8.3)
WBC # BLD: 17.3 E9/L (ref 4.5–11.5)

## 2019-07-03 PROCEDURE — 6360000004 HC RX CONTRAST MEDICATION: Performed by: RADIOLOGY

## 2019-07-03 PROCEDURE — 74183 MRI ABD W/O CNTR FLWD CNTR: CPT

## 2019-07-03 PROCEDURE — 6360000002 HC RX W HCPCS: Performed by: INTERNAL MEDICINE

## 2019-07-03 PROCEDURE — 85025 COMPLETE CBC W/AUTO DIFF WBC: CPT

## 2019-07-03 PROCEDURE — A9577 INJ MULTIHANCE: HCPCS | Performed by: RADIOLOGY

## 2019-07-03 PROCEDURE — 99223 1ST HOSP IP/OBS HIGH 75: CPT | Performed by: SURGERY

## 2019-07-03 PROCEDURE — 6370000000 HC RX 637 (ALT 250 FOR IP): Performed by: INTERNAL MEDICINE

## 2019-07-03 PROCEDURE — 83690 ASSAY OF LIPASE: CPT

## 2019-07-03 PROCEDURE — 2700000000 HC OXYGEN THERAPY PER DAY

## 2019-07-03 PROCEDURE — 94640 AIRWAY INHALATION TREATMENT: CPT

## 2019-07-03 PROCEDURE — 80053 COMPREHEN METABOLIC PANEL: CPT

## 2019-07-03 PROCEDURE — C9113 INJ PANTOPRAZOLE SODIUM, VIA: HCPCS | Performed by: INTERNAL MEDICINE

## 2019-07-03 PROCEDURE — 2060000000 HC ICU INTERMEDIATE R&B

## 2019-07-03 PROCEDURE — 36415 COLL VENOUS BLD VENIPUNCTURE: CPT

## 2019-07-03 RX ADMIN — POTASSIUM CHLORIDE AND SODIUM CHLORIDE: 900; 150 INJECTION, SOLUTION INTRAVENOUS at 14:47

## 2019-07-03 RX ADMIN — CARVEDILOL 25 MG: 25 TABLET, FILM COATED ORAL at 09:05

## 2019-07-03 RX ADMIN — MORPHINE SULFATE 4 MG: 4 INJECTION INTRAVENOUS at 00:33

## 2019-07-03 RX ADMIN — MORPHINE SULFATE 4 MG: 4 INJECTION INTRAVENOUS at 06:12

## 2019-07-03 RX ADMIN — PANTOPRAZOLE SODIUM 40 MG: 40 INJECTION, POWDER, FOR SOLUTION INTRAVENOUS at 20:14

## 2019-07-03 RX ADMIN — MORPHINE SULFATE 4 MG: 4 INJECTION INTRAVENOUS at 14:47

## 2019-07-03 RX ADMIN — MORPHINE SULFATE 4 MG: 4 INJECTION INTRAVENOUS at 20:14

## 2019-07-03 RX ADMIN — ONDANSETRON 4 MG: 2 INJECTION INTRAMUSCULAR; INTRAVENOUS at 07:22

## 2019-07-03 RX ADMIN — POTASSIUM CHLORIDE AND SODIUM CHLORIDE: 900; 150 INJECTION, SOLUTION INTRAVENOUS at 23:00

## 2019-07-03 RX ADMIN — CARVEDILOL 25 MG: 25 TABLET, FILM COATED ORAL at 16:30

## 2019-07-03 RX ADMIN — PANTOPRAZOLE SODIUM 40 MG: 40 INJECTION, POWDER, FOR SOLUTION INTRAVENOUS at 09:03

## 2019-07-03 RX ADMIN — TRAMADOL HYDROCHLORIDE 50 MG: 50 TABLET, FILM COATED ORAL at 09:04

## 2019-07-03 RX ADMIN — IPRATROPIUM BROMIDE AND ALBUTEROL SULFATE 1 AMPULE: .5; 3 SOLUTION RESPIRATORY (INHALATION) at 06:37

## 2019-07-03 RX ADMIN — GADOBENATE DIMEGLUMINE 20 ML: 529 INJECTION, SOLUTION INTRAVENOUS at 13:50

## 2019-07-03 RX ADMIN — MULTIPLE VITAMINS W/ MINERALS TAB 1 TABLET: TAB at 09:04

## 2019-07-03 RX ADMIN — MORPHINE SULFATE 4 MG: 4 INJECTION INTRAVENOUS at 11:01

## 2019-07-03 RX ADMIN — ASPIRIN 81 MG 81 MG: 81 TABLET ORAL at 09:04

## 2019-07-03 RX ADMIN — PRAVASTATIN SODIUM 80 MG: 20 TABLET ORAL at 09:03

## 2019-07-03 RX ADMIN — MAGNESIUM OXIDE TAB 400 MG (241.3 MG ELEMENTAL MG) 400 MG: 400 (241.3 MG) TAB at 09:04

## 2019-07-03 RX ADMIN — ONDANSETRON 4 MG: 2 INJECTION INTRAMUSCULAR; INTRAVENOUS at 14:46

## 2019-07-03 RX ADMIN — POTASSIUM CHLORIDE AND SODIUM CHLORIDE: 900; 150 INJECTION, SOLUTION INTRAVENOUS at 02:56

## 2019-07-03 RX ADMIN — FENOFIBRATE 160 MG: 160 TABLET ORAL at 09:04

## 2019-07-03 ASSESSMENT — PAIN SCALES - GENERAL
PAINLEVEL_OUTOF10: 8
PAINLEVEL_OUTOF10: 7
PAINLEVEL_OUTOF10: 8
PAINLEVEL_OUTOF10: 4
PAINLEVEL_OUTOF10: 8
PAINLEVEL_OUTOF10: 2
PAINLEVEL_OUTOF10: 3
PAINLEVEL_OUTOF10: 8
PAINLEVEL_OUTOF10: 6

## 2019-07-03 ASSESSMENT — PAIN - FUNCTIONAL ASSESSMENT
PAIN_FUNCTIONAL_ASSESSMENT: PREVENTS OR INTERFERES SOME ACTIVE ACTIVITIES AND ADLS

## 2019-07-03 ASSESSMENT — PAIN DESCRIPTION - PAIN TYPE
TYPE: ACUTE PAIN

## 2019-07-03 ASSESSMENT — PAIN DESCRIPTION - DESCRIPTORS
DESCRIPTORS: ACHING;THROBBING
DESCRIPTORS: DISCOMFORT;TENDER;ACHING
DESCRIPTORS: DISCOMFORT;TENDER
DESCRIPTORS: ACHING;THROBBING
DESCRIPTORS: DISCOMFORT;TENDER

## 2019-07-03 ASSESSMENT — PAIN DESCRIPTION - DIRECTION: RADIATING_TOWARDS: BACK

## 2019-07-03 ASSESSMENT — PAIN DESCRIPTION - LOCATION
LOCATION: ABDOMEN
LOCATION: ABDOMEN
LOCATION: ABDOMEN;BACK
LOCATION: ABDOMEN
LOCATION: ABDOMEN

## 2019-07-03 ASSESSMENT — PAIN DESCRIPTION - ORIENTATION: ORIENTATION: UPPER

## 2019-07-03 NOTE — PROGRESS NOTES
Patient seen and examined. Patient still having some epigastric/lower chest pain radiating to his back and some lower abdominal cramping. The pain is improved from admission. Lipase is improved. Patient's family is at the bedside and case discussed. No complaints of unusual SOB, nausea, vomiting, chest pain,abd. pain, dizziness, diarrhea or constipation. /72   Pulse 104   Temp 98.7 °F (37.1 °C) (Oral)   Resp 16   Ht 5' 11\" (1.803 m)   Wt 207 lb 6 oz (94.1 kg)   SpO2 90%   BMI 28.92 kg/m²     HEENT: negative to gross visual examination  Heart: regular rate and rhythm  Lungs: Coarse decreased breath sounds  Abdomen: soft, positive bowel sounds,+ obese,+ abdominal distention,+ tenderness palpation epigastric area, no hepatosplenomegaly, no guarding, rebound, rigidity  Ext: no clubbing, cyanosis, erythema, edema  Neuro: alert and oriented.  No gross deficits    CBC with Differential:    Lab Results   Component Value Date    WBC 17.3 07/03/2019    RBC 4.19 07/03/2019    HGB 11.7 07/03/2019    HCT 38.5 07/03/2019     07/03/2019    MCV 91.9 07/03/2019    MCH 27.9 07/03/2019    MCHC 30.4 07/03/2019    RDW 15.3 07/03/2019    LYMPHOPCT 3.0 07/03/2019    MONOPCT 9.0 07/03/2019    MYELOPCT 1.0 07/03/2019    BASOPCT 0.0 07/03/2019    MONOSABS 1.56 07/03/2019    LYMPHSABS 0.52 07/03/2019    EOSABS 0.17 07/03/2019    BASOSABS 0.00 07/03/2019     CMP:    Lab Results   Component Value Date     07/03/2019    K 4.5 07/03/2019    K 3.6 07/01/2019    CL 97 07/03/2019    CO2 22 07/03/2019    BUN 9 07/03/2019    CREATININE 0.7 07/03/2019    GFRAA >60 07/03/2019    LABGLOM >60 07/03/2019    GLUCOSE 70 07/03/2019    PROT 6.7 07/03/2019    LABALBU 2.8 07/03/2019    CALCIUM 9.2 07/03/2019    BILITOT 1.2 07/03/2019    ALKPHOS 103 07/03/2019    AST 72 07/03/2019    ALT 35 07/03/2019     Magnesium:    Lab Results   Component Value Date    MG 1.7 07/02/2019     Phosphorus:    Lab Results   Component Value Date PHOS 2.5 07/02/2019     PT/INR:  No results found for: PROTIME, INR  Troponin:    Lab Results   Component Value Date    TROPONINI <0.01 07/01/2019     U/A:  No results found for: NITRITE, COLORU, PROTEINU, PHUR, LABCAST, WBCUA, RBCUA, MUCUS, TRICHOMONAS, YEAST, BACTERIA, CLARITYU, SPECGRAV, LEUKOCYTESUR, UROBILINOGEN, BILIRUBINUR, BLOODU, GLUCOSEU, AMORPHOUS  HgBA1c:  No results found for: LABA1C  FLP:    Lab Results   Component Value Date    TRIG 116 07/02/2019    HDL 25 07/02/2019    LDLCALC 72 07/02/2019    LABVLDL 23 07/02/2019     TSH:    Lab Results   Component Value Date    TSH 1.830 07/02/2019     LIPASE:    Lab Results   Component Value Date    LIPASE 84 07/03/2019       No results for input(s): GLUMET in the last 72 hours. CT ABDOMEN PELVIS W IV CONTRAST Additional Contrast? Oral   Final Result   Extensive inflammatory process in the upper abdomen. Differential includes pancreatitis and duodenitis. Air bubbles within   the region of the abnormal appearing duodenum could be intraluminal or   intramural in location. Inflammatory process is also associated with   some mural thickening of the ascending portion of the colon.  mometasone-formoterol  2 puff Inhalation BID    aspirin  81 mg Oral Daily    carvedilol  25 mg Oral BID WC    cholestyramine light  4 g Oral Daily    fenofibrate  160 mg Oral Daily    magnesium oxide  400 mg Oral Daily    therapeutic multivitamin-minerals  1 tablet Oral Daily    pravastatin  80 mg Oral Daily    traMADol  50 mg Oral BID    pantoprazole  40 mg Intravenous BID    ipratropium-albuterol  1 ampule Inhalation Q4H WA        Assessment; Active Problems:    Acute pancreatitis    Hypertension    Hyperlipidemia    COPD (chronic obstructive pulmonary disease) (HCC)      Plan:   MRCP today  Increase diet to clear liquids  Labs in a.m. General surgery note reviewed    See orders.         Jd Arthur DO  11:48 AM  7/3/2019

## 2019-07-04 LAB
ALBUMIN SERPL-MCNC: 3 G/DL (ref 3.5–5.2)
ALP BLD-CCNC: 87 U/L (ref 40–129)
ALT SERPL-CCNC: 26 U/L (ref 0–40)
ANION GAP SERPL CALCULATED.3IONS-SCNC: 8 MMOL/L (ref 7–16)
AST SERPL-CCNC: 42 U/L (ref 0–39)
BASOPHILS ABSOLUTE: 0 E9/L (ref 0–0.2)
BASOPHILS RELATIVE PERCENT: 0.3 % (ref 0–2)
BILIRUB SERPL-MCNC: 0.9 MG/DL (ref 0–1.2)
BUN BLDV-MCNC: 7 MG/DL (ref 8–23)
CALCIUM SERPL-MCNC: 8.5 MG/DL (ref 8.6–10.2)
CHLORIDE BLD-SCNC: 99 MMOL/L (ref 98–107)
CO2: 27 MMOL/L (ref 22–29)
CREAT SERPL-MCNC: 0.8 MG/DL (ref 0.7–1.2)
EOSINOPHILS ABSOLUTE: 0.1 E9/L (ref 0.05–0.5)
EOSINOPHILS RELATIVE PERCENT: 0.9 % (ref 0–6)
GFR AFRICAN AMERICAN: >60
GFR NON-AFRICAN AMERICAN: >60 ML/MIN/1.73
GLUCOSE BLD-MCNC: 116 MG/DL (ref 74–99)
HCT VFR BLD CALC: 31.3 % (ref 37–54)
HEMOGLOBIN: 9.7 G/DL (ref 12.5–16.5)
LIPASE: 90 U/L (ref 13–60)
LYMPHOCYTES ABSOLUTE: 0.35 E9/L (ref 1.5–4)
LYMPHOCYTES RELATIVE PERCENT: 2.6 % (ref 20–42)
MCH RBC QN AUTO: 28.1 PG (ref 26–35)
MCHC RBC AUTO-ENTMCNC: 31 % (ref 32–34.5)
MCV RBC AUTO: 90.7 FL (ref 80–99.9)
MONOCYTES ABSOLUTE: 0.93 E9/L (ref 0.1–0.95)
MONOCYTES RELATIVE PERCENT: 7.8 % (ref 2–12)
NEUTROPHILS ABSOLUTE: 10.32 E9/L (ref 1.8–7.3)
NEUTROPHILS RELATIVE PERCENT: 88.7 % (ref 43–80)
PDW BLD-RTO: 15 FL (ref 11.5–15)
PLATELET # BLD: 171 E9/L (ref 130–450)
PMV BLD AUTO: 9.6 FL (ref 7–12)
POTASSIUM SERPL-SCNC: 4 MMOL/L (ref 3.5–5)
RBC # BLD: 3.45 E12/L (ref 3.8–5.8)
RBC # BLD: NORMAL 10*6/UL
SODIUM BLD-SCNC: 134 MMOL/L (ref 132–146)
TOTAL PROTEIN: 6 G/DL (ref 6.4–8.3)
WBC # BLD: 11.6 E9/L (ref 4.5–11.5)

## 2019-07-04 PROCEDURE — 94640 AIRWAY INHALATION TREATMENT: CPT

## 2019-07-04 PROCEDURE — 85025 COMPLETE CBC W/AUTO DIFF WBC: CPT

## 2019-07-04 PROCEDURE — 6370000000 HC RX 637 (ALT 250 FOR IP): Performed by: INTERNAL MEDICINE

## 2019-07-04 PROCEDURE — 99232 SBSQ HOSP IP/OBS MODERATE 35: CPT | Performed by: SURGERY

## 2019-07-04 PROCEDURE — 80053 COMPREHEN METABOLIC PANEL: CPT

## 2019-07-04 PROCEDURE — C9113 INJ PANTOPRAZOLE SODIUM, VIA: HCPCS | Performed by: INTERNAL MEDICINE

## 2019-07-04 PROCEDURE — 36415 COLL VENOUS BLD VENIPUNCTURE: CPT

## 2019-07-04 PROCEDURE — 6360000002 HC RX W HCPCS: Performed by: INTERNAL MEDICINE

## 2019-07-04 PROCEDURE — 2060000000 HC ICU INTERMEDIATE R&B

## 2019-07-04 PROCEDURE — 94664 DEMO&/EVAL PT USE INHALER: CPT

## 2019-07-04 PROCEDURE — 2700000000 HC OXYGEN THERAPY PER DAY

## 2019-07-04 PROCEDURE — 83690 ASSAY OF LIPASE: CPT

## 2019-07-04 RX ADMIN — FENOFIBRATE 160 MG: 160 TABLET ORAL at 10:08

## 2019-07-04 RX ADMIN — MORPHINE SULFATE 4 MG: 4 INJECTION INTRAVENOUS at 06:35

## 2019-07-04 RX ADMIN — TRAMADOL HYDROCHLORIDE 50 MG: 50 TABLET, FILM COATED ORAL at 21:47

## 2019-07-04 RX ADMIN — POTASSIUM CHLORIDE AND SODIUM CHLORIDE: 900; 150 INJECTION, SOLUTION INTRAVENOUS at 07:21

## 2019-07-04 RX ADMIN — CHOLESTYRAMINE 4 G: 4 POWDER, FOR SUSPENSION ORAL at 10:07

## 2019-07-04 RX ADMIN — PANTOPRAZOLE SODIUM 40 MG: 40 INJECTION, POWDER, FOR SOLUTION INTRAVENOUS at 20:42

## 2019-07-04 RX ADMIN — ACETAMINOPHEN 500 MG: 500 TABLET, FILM COATED ORAL at 01:04

## 2019-07-04 RX ADMIN — MULTIPLE VITAMINS W/ MINERALS TAB 1 TABLET: TAB at 10:08

## 2019-07-04 RX ADMIN — ONDANSETRON 4 MG: 2 INJECTION INTRAMUSCULAR; INTRAVENOUS at 13:59

## 2019-07-04 RX ADMIN — ONDANSETRON 4 MG: 2 INJECTION INTRAMUSCULAR; INTRAVENOUS at 05:23

## 2019-07-04 RX ADMIN — CARVEDILOL 25 MG: 25 TABLET, FILM COATED ORAL at 16:45

## 2019-07-04 RX ADMIN — MORPHINE SULFATE 4 MG: 4 INJECTION INTRAVENOUS at 20:42

## 2019-07-04 RX ADMIN — IPRATROPIUM BROMIDE AND ALBUTEROL SULFATE 1 AMPULE: .5; 3 SOLUTION RESPIRATORY (INHALATION) at 06:45

## 2019-07-04 RX ADMIN — ONDANSETRON 4 MG: 2 INJECTION INTRAMUSCULAR; INTRAVENOUS at 21:54

## 2019-07-04 RX ADMIN — ASPIRIN 81 MG 81 MG: 81 TABLET ORAL at 10:08

## 2019-07-04 RX ADMIN — IPRATROPIUM BROMIDE AND ALBUTEROL SULFATE 1 AMPULE: .5; 3 SOLUTION RESPIRATORY (INHALATION) at 10:23

## 2019-07-04 RX ADMIN — IPRATROPIUM BROMIDE AND ALBUTEROL SULFATE 1 AMPULE: .5; 3 SOLUTION RESPIRATORY (INHALATION) at 15:32

## 2019-07-04 RX ADMIN — MOMETASONE FUROATE AND FORMOTEROL FUMARATE DIHYDRATE 2 PUFF: 200; 5 AEROSOL RESPIRATORY (INHALATION) at 19:38

## 2019-07-04 RX ADMIN — PRAVASTATIN SODIUM 80 MG: 20 TABLET ORAL at 10:07

## 2019-07-04 RX ADMIN — MAGNESIUM OXIDE TAB 400 MG (241.3 MG ELEMENTAL MG) 400 MG: 400 (241.3 MG) TAB at 10:08

## 2019-07-04 RX ADMIN — TRAMADOL HYDROCHLORIDE 50 MG: 50 TABLET, FILM COATED ORAL at 10:08

## 2019-07-04 RX ADMIN — PANTOPRAZOLE SODIUM 40 MG: 40 INJECTION, POWDER, FOR SOLUTION INTRAVENOUS at 10:07

## 2019-07-04 RX ADMIN — MORPHINE SULFATE 4 MG: 4 INJECTION INTRAVENOUS at 13:59

## 2019-07-04 RX ADMIN — MOMETASONE FUROATE AND FORMOTEROL FUMARATE DIHYDRATE 2 PUFF: 200; 5 AEROSOL RESPIRATORY (INHALATION) at 06:45

## 2019-07-04 RX ADMIN — IPRATROPIUM BROMIDE AND ALBUTEROL SULFATE 1 AMPULE: .5; 3 SOLUTION RESPIRATORY (INHALATION) at 19:28

## 2019-07-04 RX ADMIN — CARVEDILOL 25 MG: 25 TABLET, FILM COATED ORAL at 10:08

## 2019-07-04 ASSESSMENT — PAIN SCALES - GENERAL
PAINLEVEL_OUTOF10: 3
PAINLEVEL_OUTOF10: 7
PAINLEVEL_OUTOF10: 10
PAINLEVEL_OUTOF10: 2
PAINLEVEL_OUTOF10: 7
PAINLEVEL_OUTOF10: 0
PAINLEVEL_OUTOF10: 10
PAINLEVEL_OUTOF10: 3

## 2019-07-04 ASSESSMENT — PAIN DESCRIPTION - ONSET: ONSET: AWAKENED FROM SLEEP

## 2019-07-04 ASSESSMENT — PAIN DESCRIPTION - FREQUENCY: FREQUENCY: CONTINUOUS

## 2019-07-04 ASSESSMENT — PAIN DESCRIPTION - PAIN TYPE: TYPE: ACUTE PAIN

## 2019-07-04 ASSESSMENT — PAIN DESCRIPTION - PROGRESSION: CLINICAL_PROGRESSION: GRADUALLY WORSENING

## 2019-07-04 ASSESSMENT — PAIN - FUNCTIONAL ASSESSMENT: PAIN_FUNCTIONAL_ASSESSMENT: ACTIVITIES ARE NOT PREVENTED

## 2019-07-04 ASSESSMENT — PAIN DESCRIPTION - LOCATION: LOCATION: HEAD

## 2019-07-04 ASSESSMENT — PAIN DESCRIPTION - DESCRIPTORS: DESCRIPTORS: HEADACHE

## 2019-07-05 ENCOUNTER — APPOINTMENT (OUTPATIENT)
Dept: GENERAL RADIOLOGY | Age: 69
DRG: 439 | End: 2019-07-05
Payer: OTHER GOVERNMENT

## 2019-07-05 LAB
ANISOCYTOSIS: ABNORMAL
BASOPHILS ABSOLUTE: 0.09 E9/L (ref 0–0.2)
BASOPHILS RELATIVE PERCENT: 0.9 % (ref 0–2)
EOSINOPHILS ABSOLUTE: 0.18 E9/L (ref 0.05–0.5)
EOSINOPHILS RELATIVE PERCENT: 1.7 % (ref 0–6)
HCT VFR BLD CALC: 30.4 % (ref 37–54)
HEMOGLOBIN: 9.5 G/DL (ref 12.5–16.5)
LIPASE: 90 U/L (ref 13–60)
LYMPHOCYTES ABSOLUTE: 0.21 E9/L (ref 1.5–4)
LYMPHOCYTES RELATIVE PERCENT: 1.7 % (ref 20–42)
MCH RBC QN AUTO: 28.1 PG (ref 26–35)
MCHC RBC AUTO-ENTMCNC: 31.3 % (ref 32–34.5)
MCV RBC AUTO: 89.9 FL (ref 80–99.9)
MONOCYTES ABSOLUTE: 0.41 E9/L (ref 0.1–0.95)
MONOCYTES RELATIVE PERCENT: 4.3 % (ref 2–12)
NEUTROPHILS ABSOLUTE: 9.37 E9/L (ref 1.8–7.3)
NEUTROPHILS RELATIVE PERCENT: 91.3 % (ref 43–80)
OVALOCYTES: ABNORMAL
PDW BLD-RTO: 14.7 FL (ref 11.5–15)
PLATELET # BLD: 217 E9/L (ref 130–450)
PMV BLD AUTO: 9.8 FL (ref 7–12)
POIKILOCYTES: ABNORMAL
POLYCHROMASIA: ABNORMAL
RBC # BLD: 3.38 E12/L (ref 3.8–5.8)
TEAR DROP CELLS: ABNORMAL
TOXIC GRANULATION: ABNORMAL
WBC # BLD: 10.3 E9/L (ref 4.5–11.5)

## 2019-07-05 PROCEDURE — 94640 AIRWAY INHALATION TREATMENT: CPT

## 2019-07-05 PROCEDURE — 36415 COLL VENOUS BLD VENIPUNCTURE: CPT

## 2019-07-05 PROCEDURE — 85025 COMPLETE CBC W/AUTO DIFF WBC: CPT

## 2019-07-05 PROCEDURE — 71046 X-RAY EXAM CHEST 2 VIEWS: CPT

## 2019-07-05 PROCEDURE — 2700000000 HC OXYGEN THERAPY PER DAY

## 2019-07-05 PROCEDURE — 6370000000 HC RX 637 (ALT 250 FOR IP): Performed by: INTERNAL MEDICINE

## 2019-07-05 PROCEDURE — 6360000002 HC RX W HCPCS: Performed by: INTERNAL MEDICINE

## 2019-07-05 PROCEDURE — C9113 INJ PANTOPRAZOLE SODIUM, VIA: HCPCS | Performed by: INTERNAL MEDICINE

## 2019-07-05 PROCEDURE — 2060000000 HC ICU INTERMEDIATE R&B

## 2019-07-05 PROCEDURE — 83690 ASSAY OF LIPASE: CPT

## 2019-07-05 RX ADMIN — TRAMADOL HYDROCHLORIDE 50 MG: 50 TABLET, FILM COATED ORAL at 20:33

## 2019-07-05 RX ADMIN — ACETAMINOPHEN 500 MG: 500 TABLET, FILM COATED ORAL at 14:37

## 2019-07-05 RX ADMIN — ASPIRIN 81 MG 81 MG: 81 TABLET ORAL at 08:28

## 2019-07-05 RX ADMIN — PANTOPRAZOLE SODIUM 40 MG: 40 INJECTION, POWDER, FOR SOLUTION INTRAVENOUS at 08:29

## 2019-07-05 RX ADMIN — IPRATROPIUM BROMIDE AND ALBUTEROL SULFATE 1 AMPULE: .5; 3 SOLUTION RESPIRATORY (INHALATION) at 06:10

## 2019-07-05 RX ADMIN — POTASSIUM CHLORIDE AND SODIUM CHLORIDE: 900; 150 INJECTION, SOLUTION INTRAVENOUS at 10:37

## 2019-07-05 RX ADMIN — CARVEDILOL 25 MG: 25 TABLET, FILM COATED ORAL at 08:28

## 2019-07-05 RX ADMIN — CHOLESTYRAMINE 4 G: 4 POWDER, FOR SUSPENSION ORAL at 08:28

## 2019-07-05 RX ADMIN — CARVEDILOL 25 MG: 25 TABLET, FILM COATED ORAL at 17:27

## 2019-07-05 RX ADMIN — MAGNESIUM OXIDE TAB 400 MG (241.3 MG ELEMENTAL MG) 400 MG: 400 (241.3 MG) TAB at 08:29

## 2019-07-05 RX ADMIN — MULTIPLE VITAMINS W/ MINERALS TAB 1 TABLET: TAB at 08:28

## 2019-07-05 RX ADMIN — PRAVASTATIN SODIUM 80 MG: 20 TABLET ORAL at 08:29

## 2019-07-05 RX ADMIN — ONDANSETRON 4 MG: 2 INJECTION INTRAMUSCULAR; INTRAVENOUS at 03:58

## 2019-07-05 RX ADMIN — MORPHINE SULFATE 4 MG: 4 INJECTION INTRAVENOUS at 05:10

## 2019-07-05 RX ADMIN — POTASSIUM CHLORIDE AND SODIUM CHLORIDE: 900; 150 INJECTION, SOLUTION INTRAVENOUS at 23:42

## 2019-07-05 RX ADMIN — FENOFIBRATE 160 MG: 160 TABLET ORAL at 08:28

## 2019-07-05 RX ADMIN — MORPHINE SULFATE 4 MG: 4 INJECTION INTRAVENOUS at 00:46

## 2019-07-05 RX ADMIN — IPRATROPIUM BROMIDE AND ALBUTEROL SULFATE 1 AMPULE: .5; 3 SOLUTION RESPIRATORY (INHALATION) at 13:18

## 2019-07-05 RX ADMIN — ONDANSETRON 4 MG: 2 INJECTION INTRAMUSCULAR; INTRAVENOUS at 18:00

## 2019-07-05 RX ADMIN — TRAMADOL HYDROCHLORIDE 50 MG: 50 TABLET, FILM COATED ORAL at 08:28

## 2019-07-05 RX ADMIN — ONDANSETRON 4 MG: 2 INJECTION INTRAMUSCULAR; INTRAVENOUS at 10:36

## 2019-07-05 RX ADMIN — PANTOPRAZOLE SODIUM 40 MG: 40 INJECTION, POWDER, FOR SOLUTION INTRAVENOUS at 20:33

## 2019-07-05 RX ADMIN — MOMETASONE FUROATE AND FORMOTEROL FUMARATE DIHYDRATE 2 PUFF: 200; 5 AEROSOL RESPIRATORY (INHALATION) at 06:12

## 2019-07-05 RX ADMIN — MORPHINE SULFATE 4 MG: 4 INJECTION INTRAVENOUS at 10:36

## 2019-07-05 RX ADMIN — MORPHINE SULFATE 4 MG: 4 INJECTION INTRAVENOUS at 15:26

## 2019-07-05 RX ADMIN — IPRATROPIUM BROMIDE AND ALBUTEROL SULFATE 1 AMPULE: .5; 3 SOLUTION RESPIRATORY (INHALATION) at 09:22

## 2019-07-05 RX ADMIN — ACETAMINOPHEN 500 MG: 500 TABLET, FILM COATED ORAL at 23:42

## 2019-07-05 ASSESSMENT — PAIN SCALES - GENERAL
PAINLEVEL_OUTOF10: 9
PAINLEVEL_OUTOF10: 8
PAINLEVEL_OUTOF10: 10
PAINLEVEL_OUTOF10: 8
PAINLEVEL_OUTOF10: 0
PAINLEVEL_OUTOF10: 3
PAINLEVEL_OUTOF10: 7
PAINLEVEL_OUTOF10: 9
PAINLEVEL_OUTOF10: 8
PAINLEVEL_OUTOF10: 3

## 2019-07-05 ASSESSMENT — PAIN DESCRIPTION - DESCRIPTORS
DESCRIPTORS: ACHING;DISCOMFORT;HEADACHE
DESCRIPTORS: ACHING;CONSTANT;STABBING

## 2019-07-05 ASSESSMENT — PAIN DESCRIPTION - ORIENTATION
ORIENTATION: UPPER
ORIENTATION: UPPER

## 2019-07-05 ASSESSMENT — PAIN - FUNCTIONAL ASSESSMENT
PAIN_FUNCTIONAL_ASSESSMENT: ACTIVITIES ARE NOT PREVENTED
PAIN_FUNCTIONAL_ASSESSMENT: ACTIVITIES ARE NOT PREVENTED

## 2019-07-05 ASSESSMENT — PAIN DESCRIPTION - PAIN TYPE
TYPE: CHRONIC PAIN

## 2019-07-05 ASSESSMENT — PAIN DESCRIPTION - PROGRESSION
CLINICAL_PROGRESSION: GRADUALLY WORSENING
CLINICAL_PROGRESSION: NOT CHANGED
CLINICAL_PROGRESSION: NOT CHANGED

## 2019-07-05 ASSESSMENT — PAIN DESCRIPTION - LOCATION
LOCATION: HEAD
LOCATION: CHEST;BACK
LOCATION: CHEST;BACK

## 2019-07-05 ASSESSMENT — PAIN DESCRIPTION - ONSET
ONSET: ON-GOING
ONSET: ON-GOING

## 2019-07-05 ASSESSMENT — PAIN DESCRIPTION - FREQUENCY
FREQUENCY: CONTINUOUS
FREQUENCY: CONTINUOUS

## 2019-07-05 ASSESSMENT — PAIN DESCRIPTION - DIRECTION: RADIATING_TOWARDS: BACK

## 2019-07-05 NOTE — PROGRESS NOTES
Patient seen and examined. Patient still having some epigastric/lower chest pain radiating to his back and continues to improve on a daily basis. Lipase is 90. MRCP results reviewed. Patient's family is not at the bedside. No complaints of unusual SOB, nausea, vomiting, chest pain,abd. pain, dizziness, diarrhea or constipation. /74   Pulse 85   Temp 97.5 °F (36.4 °C) (Oral)   Resp 18   Ht 5' 11\" (1.803 m)   Wt 207 lb 6 oz (94.1 kg)   SpO2 93%   BMI 28.92 kg/m²     HEENT: negative to gross visual examination  Heart: regular rate and rhythm  Lungs: Coarse decreased breath sounds  Abdomen: soft, positive bowel sounds,+ obese,+ abdominal distention,+ tenderness palpation epigastric area, no hepatosplenomegaly, no guarding, rebound, rigidity  Ext: no clubbing, cyanosis, erythema, edema  Neuro: alert and oriented.  No gross deficits    CBC with Differential:    Lab Results   Component Value Date    WBC 10.3 07/05/2019    RBC 3.38 07/05/2019    HGB 9.5 07/05/2019    HCT 30.4 07/05/2019     07/05/2019    MCV 89.9 07/05/2019    MCH 28.1 07/05/2019    MCHC 31.3 07/05/2019    RDW 14.7 07/05/2019    LYMPHOPCT 1.7 07/05/2019    MONOPCT 4.3 07/05/2019    MYELOPCT 1.0 07/03/2019    BASOPCT 0.9 07/05/2019    MONOSABS 0.41 07/05/2019    LYMPHSABS 0.21 07/05/2019    EOSABS 0.18 07/05/2019    BASOSABS 0.09 07/05/2019     CMP:    Lab Results   Component Value Date     07/04/2019    K 4.0 07/04/2019    K 3.6 07/01/2019    CL 99 07/04/2019    CO2 27 07/04/2019    BUN 7 07/04/2019    CREATININE 0.8 07/04/2019    GFRAA >60 07/04/2019    LABGLOM >60 07/04/2019    GLUCOSE 116 07/04/2019    PROT 6.0 07/04/2019    LABALBU 3.0 07/04/2019    CALCIUM 8.5 07/04/2019    BILITOT 0.9 07/04/2019    ALKPHOS 87 07/04/2019    AST 42 07/04/2019    ALT 26 07/04/2019     Magnesium:    Lab Results   Component Value Date    MG 1.7 07/02/2019     Phosphorus:    Lab Results   Component Value Date    PHOS 2.5 07/02/2019

## 2019-07-06 LAB
ALBUMIN SERPL-MCNC: 2.9 G/DL (ref 3.5–5.2)
ALP BLD-CCNC: 76 U/L (ref 40–129)
ALT SERPL-CCNC: 21 U/L (ref 0–40)
ANION GAP SERPL CALCULATED.3IONS-SCNC: 13 MMOL/L (ref 7–16)
AST SERPL-CCNC: 27 U/L (ref 0–39)
BASOPHILS ABSOLUTE: 0.05 E9/L (ref 0–0.2)
BASOPHILS RELATIVE PERCENT: 0.5 % (ref 0–2)
BILIRUB SERPL-MCNC: 0.4 MG/DL (ref 0–1.2)
BUN BLDV-MCNC: 3 MG/DL (ref 8–23)
CALCIUM SERPL-MCNC: 9.2 MG/DL (ref 8.6–10.2)
CHLORIDE BLD-SCNC: 101 MMOL/L (ref 98–107)
CO2: 26 MMOL/L (ref 22–29)
CREAT SERPL-MCNC: 0.7 MG/DL (ref 0.7–1.2)
EOSINOPHILS ABSOLUTE: 0.24 E9/L (ref 0.05–0.5)
EOSINOPHILS RELATIVE PERCENT: 2.2 % (ref 0–6)
GFR AFRICAN AMERICAN: >60
GFR NON-AFRICAN AMERICAN: >60 ML/MIN/1.73
GLUCOSE BLD-MCNC: 115 MG/DL (ref 74–99)
HCT VFR BLD CALC: 32.8 % (ref 37–54)
HEMOGLOBIN: 10 G/DL (ref 12.5–16.5)
IMMATURE GRANULOCYTES #: 0.14 E9/L
IMMATURE GRANULOCYTES %: 1.3 % (ref 0–5)
LIPASE: 163 U/L (ref 13–60)
LYMPHOCYTES ABSOLUTE: 0.87 E9/L (ref 1.5–4)
LYMPHOCYTES RELATIVE PERCENT: 8 % (ref 20–42)
MCH RBC QN AUTO: 27.7 PG (ref 26–35)
MCHC RBC AUTO-ENTMCNC: 30.5 % (ref 32–34.5)
MCV RBC AUTO: 90.9 FL (ref 80–99.9)
METER GLUCOSE: 100 MG/DL (ref 74–99)
MONOCYTES ABSOLUTE: 1.04 E9/L (ref 0.1–0.95)
MONOCYTES RELATIVE PERCENT: 9.5 % (ref 2–12)
NEUTROPHILS ABSOLUTE: 8.59 E9/L (ref 1.8–7.3)
NEUTROPHILS RELATIVE PERCENT: 78.5 % (ref 43–80)
PDW BLD-RTO: 14.8 FL (ref 11.5–15)
PLATELET # BLD: 282 E9/L (ref 130–450)
PMV BLD AUTO: 9.8 FL (ref 7–12)
POTASSIUM SERPL-SCNC: 3.9 MMOL/L (ref 3.5–5)
RBC # BLD: 3.61 E12/L (ref 3.8–5.8)
SODIUM BLD-SCNC: 140 MMOL/L (ref 132–146)
TOTAL PROTEIN: 6.2 G/DL (ref 6.4–8.3)
WBC # BLD: 10.9 E9/L (ref 4.5–11.5)

## 2019-07-06 PROCEDURE — C9113 INJ PANTOPRAZOLE SODIUM, VIA: HCPCS | Performed by: INTERNAL MEDICINE

## 2019-07-06 PROCEDURE — 6360000002 HC RX W HCPCS: Performed by: INTERNAL MEDICINE

## 2019-07-06 PROCEDURE — 83690 ASSAY OF LIPASE: CPT

## 2019-07-06 PROCEDURE — 6370000000 HC RX 637 (ALT 250 FOR IP): Performed by: INTERNAL MEDICINE

## 2019-07-06 PROCEDURE — 85025 COMPLETE CBC W/AUTO DIFF WBC: CPT

## 2019-07-06 PROCEDURE — 82962 GLUCOSE BLOOD TEST: CPT

## 2019-07-06 PROCEDURE — 80053 COMPREHEN METABOLIC PANEL: CPT

## 2019-07-06 PROCEDURE — 2060000000 HC ICU INTERMEDIATE R&B

## 2019-07-06 PROCEDURE — 36415 COLL VENOUS BLD VENIPUNCTURE: CPT

## 2019-07-06 PROCEDURE — 2700000000 HC OXYGEN THERAPY PER DAY

## 2019-07-06 RX ORDER — ACETAMINOPHEN 500 MG
1000 TABLET ORAL EVERY 8 HOURS PRN
Status: DISCONTINUED | OUTPATIENT
Start: 2019-07-06 | End: 2019-07-09

## 2019-07-06 RX ORDER — CETIRIZINE HYDROCHLORIDE 10 MG/1
10 TABLET ORAL DAILY
Status: ON HOLD | COMMUNITY
End: 2019-07-10 | Stop reason: HOSPADM

## 2019-07-06 RX ADMIN — CHOLESTYRAMINE 4 G: 4 POWDER, FOR SUSPENSION ORAL at 09:13

## 2019-07-06 RX ADMIN — CARVEDILOL 25 MG: 25 TABLET, FILM COATED ORAL at 09:12

## 2019-07-06 RX ADMIN — MORPHINE SULFATE 4 MG: 4 INJECTION INTRAVENOUS at 22:09

## 2019-07-06 RX ADMIN — POTASSIUM CHLORIDE AND SODIUM CHLORIDE: 900; 150 INJECTION, SOLUTION INTRAVENOUS at 13:37

## 2019-07-06 RX ADMIN — MORPHINE SULFATE 4 MG: 4 INJECTION INTRAVENOUS at 18:01

## 2019-07-06 RX ADMIN — ONDANSETRON 4 MG: 2 INJECTION INTRAMUSCULAR; INTRAVENOUS at 21:47

## 2019-07-06 RX ADMIN — PRAVASTATIN SODIUM 80 MG: 20 TABLET ORAL at 09:11

## 2019-07-06 RX ADMIN — TRAMADOL HYDROCHLORIDE 50 MG: 50 TABLET, FILM COATED ORAL at 09:12

## 2019-07-06 RX ADMIN — MAGNESIUM HYDROXIDE 30 ML: 400 SUSPENSION ORAL at 18:01

## 2019-07-06 RX ADMIN — CARVEDILOL 25 MG: 25 TABLET, FILM COATED ORAL at 18:01

## 2019-07-06 RX ADMIN — ASPIRIN 81 MG 81 MG: 81 TABLET ORAL at 09:12

## 2019-07-06 RX ADMIN — PANTOPRAZOLE SODIUM 40 MG: 40 INJECTION, POWDER, FOR SOLUTION INTRAVENOUS at 20:18

## 2019-07-06 RX ADMIN — MAGNESIUM OXIDE TAB 400 MG (241.3 MG ELEMENTAL MG) 400 MG: 400 (241.3 MG) TAB at 09:11

## 2019-07-06 RX ADMIN — MULTIPLE VITAMINS W/ MINERALS TAB 1 TABLET: TAB at 09:12

## 2019-07-06 RX ADMIN — FENOFIBRATE 160 MG: 160 TABLET ORAL at 09:12

## 2019-07-06 RX ADMIN — MORPHINE SULFATE 4 MG: 4 INJECTION INTRAVENOUS at 13:36

## 2019-07-06 RX ADMIN — ONDANSETRON 4 MG: 2 INJECTION INTRAMUSCULAR; INTRAVENOUS at 13:36

## 2019-07-06 RX ADMIN — ONDANSETRON 4 MG: 2 INJECTION INTRAMUSCULAR; INTRAVENOUS at 00:07

## 2019-07-06 RX ADMIN — ACETAMINOPHEN 1000 MG: 500 TABLET, FILM COATED ORAL at 05:56

## 2019-07-06 RX ADMIN — TRAMADOL HYDROCHLORIDE 50 MG: 50 TABLET, FILM COATED ORAL at 20:17

## 2019-07-06 RX ADMIN — PANTOPRAZOLE SODIUM 40 MG: 40 INJECTION, POWDER, FOR SOLUTION INTRAVENOUS at 09:13

## 2019-07-06 ASSESSMENT — PAIN DESCRIPTION - DESCRIPTORS
DESCRIPTORS: ACHING;CONSTANT;STABBING
DESCRIPTORS: PRESSURE
DESCRIPTORS: DISCOMFORT;CRAMPING

## 2019-07-06 ASSESSMENT — PAIN DESCRIPTION - PAIN TYPE
TYPE: ACUTE PAIN
TYPE: ACUTE PAIN
TYPE: CHRONIC PAIN
TYPE: ACUTE PAIN

## 2019-07-06 ASSESSMENT — PAIN DESCRIPTION - ONSET
ONSET: ON-GOING
ONSET: ON-GOING

## 2019-07-06 ASSESSMENT — PAIN DESCRIPTION - LOCATION
LOCATION: ABDOMEN
LOCATION: HEAD
LOCATION: ABDOMEN
LOCATION: CHEST;BACK

## 2019-07-06 ASSESSMENT — PAIN SCALES - GENERAL
PAINLEVEL_OUTOF10: 4
PAINLEVEL_OUTOF10: 6
PAINLEVEL_OUTOF10: 8
PAINLEVEL_OUTOF10: 8
PAINLEVEL_OUTOF10: 3
PAINLEVEL_OUTOF10: 8
PAINLEVEL_OUTOF10: 8
PAINLEVEL_OUTOF10: 10
PAINLEVEL_OUTOF10: 9
PAINLEVEL_OUTOF10: 10
PAINLEVEL_OUTOF10: 2
PAINLEVEL_OUTOF10: 4
PAINLEVEL_OUTOF10: 8

## 2019-07-06 ASSESSMENT — PAIN DESCRIPTION - FREQUENCY
FREQUENCY: CONTINUOUS
FREQUENCY: CONTINUOUS

## 2019-07-06 ASSESSMENT — PAIN DESCRIPTION - PROGRESSION
CLINICAL_PROGRESSION: NOT CHANGED
CLINICAL_PROGRESSION: NOT CHANGED

## 2019-07-06 ASSESSMENT — PAIN DESCRIPTION - ORIENTATION: ORIENTATION: ANTERIOR

## 2019-07-06 NOTE — PROGRESS NOTES
PT/INR:  No results found for: PROTIME, INR  Troponin:    Lab Results   Component Value Date    TROPONINI <0.01 07/01/2019     U/A:  No results found for: NITRITE, COLORU, PROTEINU, PHUR, LABCAST, WBCUA, RBCUA, MUCUS, TRICHOMONAS, YEAST, BACTERIA, CLARITYU, SPECGRAV, LEUKOCYTESUR, UROBILINOGEN, BILIRUBINUR, BLOODU, GLUCOSEU, AMORPHOUS  HgBA1c:  No results found for: LABA1C  FLP:    Lab Results   Component Value Date    TRIG 116 07/02/2019    HDL 25 07/02/2019    LDLCALC 72 07/02/2019    LABVLDL 23 07/02/2019     TSH:    Lab Results   Component Value Date    TSH 1.830 07/02/2019     LIPASE:    Lab Results   Component Value Date    LIPASE 163 07/06/2019       No results for input(s): GLUMET in the last 72 hours. XR CHEST STANDARD (2 VW)   Final Result   Mild cardiomegaly with suggestion of pulmonary vascular congestion and   perihilar airspace disease, likely edema versus pneumonia. MRI Abdomen W WO Contrast   Final Result   1. Redemonstration of changes of acute pancreatitis without evidence   of necrosis or loculated collection. 2. Post cholecystectomy with normal caliber bile ducts. 3. Mild wall thickening along the descending portion of the duodenum,   likely reactive. CT ABDOMEN PELVIS W IV CONTRAST Additional Contrast? Oral   Final Result   Extensive inflammatory process in the upper abdomen. Differential includes pancreatitis and duodenitis. Air bubbles within   the region of the abnormal appearing duodenum could be intraluminal or   intramural in location. Inflammatory process is also associated with   some mural thickening of the ascending portion of the colon.            mometasone-formoterol  2 puff Inhalation BID    aspirin  81 mg Oral Daily    carvedilol  25 mg Oral BID     cholestyramine light  4 g Oral Daily    fenofibrate  160 mg Oral Daily    magnesium oxide  400 mg Oral Daily    therapeutic multivitamin-minerals  1 tablet Oral Daily    pravastatin  80 mg Oral Daily    traMADol  50 mg Oral BID    pantoprazole  40 mg Intravenous BID    ipratropium-albuterol  1 ampule Inhalation Q4H WA        Assessment; Active Problems:    Acute pancreatitis    Chronic hypoxic respiratory failure with patient normally on 2 L at rest and 8 L with activity    Hypertension-102/50 7-138/78    Hyperlipidemia    COPD (chronic obstructive pulmonary disease) ([facility] [place] [address]      Plan:   Increase diet to low-fat diet  Labs in a.m. General surgery note reviewed  Hold discharge today with patient having increasing pain and monitor lipase and clinical symptoms tomorrow  Tentative discharge home tomorrow    See orders.         Ok Jaime DO  1:16 PM  7/6/2019

## 2019-07-07 LAB
AMMONIA: 33 UMOL/L (ref 16–60)
LIPASE: 121 U/L (ref 13–60)

## 2019-07-07 PROCEDURE — 82140 ASSAY OF AMMONIA: CPT

## 2019-07-07 PROCEDURE — 6370000000 HC RX 637 (ALT 250 FOR IP): Performed by: INTERNAL MEDICINE

## 2019-07-07 PROCEDURE — 2060000000 HC ICU INTERMEDIATE R&B

## 2019-07-07 PROCEDURE — C9113 INJ PANTOPRAZOLE SODIUM, VIA: HCPCS | Performed by: INTERNAL MEDICINE

## 2019-07-07 PROCEDURE — 83690 ASSAY OF LIPASE: CPT

## 2019-07-07 PROCEDURE — 2700000000 HC OXYGEN THERAPY PER DAY

## 2019-07-07 PROCEDURE — 36415 COLL VENOUS BLD VENIPUNCTURE: CPT

## 2019-07-07 PROCEDURE — 6360000002 HC RX W HCPCS: Performed by: INTERNAL MEDICINE

## 2019-07-07 RX ORDER — HYDROCODONE BITARTRATE AND ACETAMINOPHEN 7.5; 325 MG/1; MG/1
1 TABLET ORAL EVERY 6 HOURS PRN
Status: DISCONTINUED | OUTPATIENT
Start: 2019-07-07 | End: 2019-07-07

## 2019-07-07 RX ORDER — TRAMADOL HYDROCHLORIDE 50 MG/1
50 TABLET ORAL EVERY 6 HOURS PRN
Status: DISCONTINUED | OUTPATIENT
Start: 2019-07-07 | End: 2019-07-07

## 2019-07-07 RX ORDER — TRAMADOL HYDROCHLORIDE 50 MG/1
50 TABLET ORAL EVERY 6 HOURS PRN
Status: DISCONTINUED | OUTPATIENT
Start: 2019-07-07 | End: 2019-07-10 | Stop reason: HOSPADM

## 2019-07-07 RX ORDER — HYDROCODONE BITARTRATE AND ACETAMINOPHEN 7.5; 325 MG/1; MG/1
1 TABLET ORAL EVERY 6 HOURS PRN
Status: DISCONTINUED | OUTPATIENT
Start: 2019-07-07 | End: 2019-07-10 | Stop reason: HOSPADM

## 2019-07-07 RX ADMIN — HYDROCODONE BITARTRATE AND ACETAMINOPHEN 1 TABLET: 7.5; 325 TABLET ORAL at 22:11

## 2019-07-07 RX ADMIN — MULTIPLE VITAMINS W/ MINERALS TAB 1 TABLET: TAB at 10:27

## 2019-07-07 RX ADMIN — ASPIRIN 81 MG 81 MG: 81 TABLET ORAL at 10:27

## 2019-07-07 RX ADMIN — TRAMADOL HYDROCHLORIDE 50 MG: 50 TABLET, FILM COATED ORAL at 20:39

## 2019-07-07 RX ADMIN — ONDANSETRON 4 MG: 2 INJECTION INTRAMUSCULAR; INTRAVENOUS at 19:02

## 2019-07-07 RX ADMIN — ONDANSETRON 4 MG: 2 INJECTION INTRAMUSCULAR; INTRAVENOUS at 10:27

## 2019-07-07 RX ADMIN — MAGNESIUM CITRATE 296 ML: 1.75 LIQUID ORAL at 17:01

## 2019-07-07 RX ADMIN — TRAMADOL HYDROCHLORIDE 50 MG: 50 TABLET, FILM COATED ORAL at 10:27

## 2019-07-07 RX ADMIN — ONDANSETRON 4 MG: 2 INJECTION INTRAMUSCULAR; INTRAVENOUS at 04:18

## 2019-07-07 RX ADMIN — CARVEDILOL 25 MG: 25 TABLET, FILM COATED ORAL at 10:27

## 2019-07-07 RX ADMIN — MORPHINE SULFATE 4 MG: 4 INJECTION INTRAVENOUS at 02:16

## 2019-07-07 RX ADMIN — MAGNESIUM OXIDE TAB 400 MG (241.3 MG ELEMENTAL MG) 400 MG: 400 (241.3 MG) TAB at 10:26

## 2019-07-07 RX ADMIN — CARVEDILOL 25 MG: 25 TABLET, FILM COATED ORAL at 16:00

## 2019-07-07 RX ADMIN — FENOFIBRATE 160 MG: 160 TABLET ORAL at 10:26

## 2019-07-07 RX ADMIN — PANTOPRAZOLE SODIUM 40 MG: 40 INJECTION, POWDER, FOR SOLUTION INTRAVENOUS at 20:25

## 2019-07-07 RX ADMIN — POTASSIUM CHLORIDE AND SODIUM CHLORIDE: 900; 150 INJECTION, SOLUTION INTRAVENOUS at 10:25

## 2019-07-07 RX ADMIN — HYDROCODONE BITARTRATE AND ACETAMINOPHEN 1 TABLET: 7.5; 325 TABLET ORAL at 14:30

## 2019-07-07 RX ADMIN — PRAVASTATIN SODIUM 80 MG: 20 TABLET ORAL at 10:26

## 2019-07-07 RX ADMIN — MORPHINE SULFATE 4 MG: 4 INJECTION INTRAVENOUS at 09:26

## 2019-07-07 RX ADMIN — PANTOPRAZOLE SODIUM 40 MG: 40 INJECTION, POWDER, FOR SOLUTION INTRAVENOUS at 10:26

## 2019-07-07 ASSESSMENT — PAIN SCALES - GENERAL
PAINLEVEL_OUTOF10: 8
PAINLEVEL_OUTOF10: 0
PAINLEVEL_OUTOF10: 8
PAINLEVEL_OUTOF10: 7
PAINLEVEL_OUTOF10: 8
PAINLEVEL_OUTOF10: 8
PAINLEVEL_OUTOF10: 9
PAINLEVEL_OUTOF10: 9

## 2019-07-07 ASSESSMENT — PAIN DESCRIPTION - LOCATION
LOCATION: ABDOMEN
LOCATION: ABDOMEN

## 2019-07-07 ASSESSMENT — PAIN DESCRIPTION - PAIN TYPE
TYPE: ACUTE PAIN
TYPE: ACUTE PAIN

## 2019-07-07 ASSESSMENT — PAIN - FUNCTIONAL ASSESSMENT: PAIN_FUNCTIONAL_ASSESSMENT: PREVENTS OR INTERFERES SOME ACTIVE ACTIVITIES AND ADLS

## 2019-07-07 ASSESSMENT — PAIN DESCRIPTION - DESCRIPTORS: DESCRIPTORS: DISCOMFORT;PENETRATING

## 2019-07-07 NOTE — PROGRESS NOTES
Component Value Date    PHOS 2.5 07/02/2019     PT/INR:  No results found for: PROTIME, INR  Troponin:    Lab Results   Component Value Date    TROPONINI <0.01 07/01/2019     U/A:  No results found for: NITRITE, COLORU, PROTEINU, PHUR, LABCAST, WBCUA, RBCUA, MUCUS, TRICHOMONAS, YEAST, BACTERIA, CLARITYU, SPECGRAV, LEUKOCYTESUR, UROBILINOGEN, BILIRUBINUR, BLOODU, GLUCOSEU, AMORPHOUS  HgBA1c:  No results found for: LABA1C  FLP:    Lab Results   Component Value Date    TRIG 116 07/02/2019    HDL 25 07/02/2019    LDLCALC 72 07/02/2019    LABVLDL 23 07/02/2019     TSH:    Lab Results   Component Value Date    TSH 1.830 07/02/2019     LIPASE:    Lab Results   Component Value Date    LIPASE 163 07/06/2019       Recent Labs     07/06/19 2015   GLUMET 100*       XR CHEST STANDARD (2 VW)   Final Result   Mild cardiomegaly with suggestion of pulmonary vascular congestion and   perihilar airspace disease, likely edema versus pneumonia. MRI Abdomen W WO Contrast   Final Result   1. Redemonstration of changes of acute pancreatitis without evidence   of necrosis or loculated collection. 2. Post cholecystectomy with normal caliber bile ducts. 3. Mild wall thickening along the descending portion of the duodenum,   likely reactive. CT ABDOMEN PELVIS W IV CONTRAST Additional Contrast? Oral   Final Result   Extensive inflammatory process in the upper abdomen. Differential includes pancreatitis and duodenitis. Air bubbles within   the region of the abnormal appearing duodenum could be intraluminal or   intramural in location. Inflammatory process is also associated with   some mural thickening of the ascending portion of the colon.            mometasone-formoterol  2 puff Inhalation BID    aspirin  81 mg Oral Daily    carvedilol  25 mg Oral BID     cholestyramine light  4 g Oral Daily    fenofibrate  160 mg Oral Daily    magnesium oxide  400 mg Oral Daily    therapeutic multivitamin-minerals  1 tablet

## 2019-07-08 ENCOUNTER — APPOINTMENT (OUTPATIENT)
Dept: CT IMAGING | Age: 69
DRG: 439 | End: 2019-07-08
Payer: OTHER GOVERNMENT

## 2019-07-08 LAB
ALBUMIN SERPL-MCNC: 2.9 G/DL (ref 3.5–5.2)
ALP BLD-CCNC: 76 U/L (ref 40–129)
ALT SERPL-CCNC: 17 U/L (ref 0–40)
ANION GAP SERPL CALCULATED.3IONS-SCNC: 11 MMOL/L (ref 7–16)
AST SERPL-CCNC: 27 U/L (ref 0–39)
BASOPHILS ABSOLUTE: 0.05 E9/L (ref 0–0.2)
BASOPHILS RELATIVE PERCENT: 0.4 % (ref 0–2)
BILIRUB SERPL-MCNC: 0.3 MG/DL (ref 0–1.2)
BUN BLDV-MCNC: 4 MG/DL (ref 8–23)
CALCIUM SERPL-MCNC: 9.3 MG/DL (ref 8.6–10.2)
CHLORIDE BLD-SCNC: 98 MMOL/L (ref 98–107)
CO2: 29 MMOL/L (ref 22–29)
CREAT SERPL-MCNC: 0.8 MG/DL (ref 0.7–1.2)
EOSINOPHILS ABSOLUTE: 0.42 E9/L (ref 0.05–0.5)
EOSINOPHILS RELATIVE PERCENT: 3.7 % (ref 0–6)
GFR AFRICAN AMERICAN: >60
GFR NON-AFRICAN AMERICAN: >60 ML/MIN/1.73
GLUCOSE BLD-MCNC: 110 MG/DL (ref 74–99)
HCT VFR BLD CALC: 33.5 % (ref 37–54)
HEMOGLOBIN: 10.3 G/DL (ref 12.5–16.5)
IMMATURE GRANULOCYTES #: 0.2 E9/L
IMMATURE GRANULOCYTES %: 1.8 % (ref 0–5)
LIPASE: 160 U/L (ref 13–60)
LYMPHOCYTES ABSOLUTE: 1.15 E9/L (ref 1.5–4)
LYMPHOCYTES RELATIVE PERCENT: 10.3 % (ref 20–42)
MCH RBC QN AUTO: 27.8 PG (ref 26–35)
MCHC RBC AUTO-ENTMCNC: 30.7 % (ref 32–34.5)
MCV RBC AUTO: 90.3 FL (ref 80–99.9)
MONOCYTES ABSOLUTE: 0.96 E9/L (ref 0.1–0.95)
MONOCYTES RELATIVE PERCENT: 8.6 % (ref 2–12)
NEUTROPHILS ABSOLUTE: 8.43 E9/L (ref 1.8–7.3)
NEUTROPHILS RELATIVE PERCENT: 75.2 % (ref 43–80)
PDW BLD-RTO: 14.7 FL (ref 11.5–15)
PLATELET # BLD: 361 E9/L (ref 130–450)
PMV BLD AUTO: 9.4 FL (ref 7–12)
POTASSIUM SERPL-SCNC: 4.2 MMOL/L (ref 3.5–5)
RBC # BLD: 3.71 E12/L (ref 3.8–5.8)
SODIUM BLD-SCNC: 138 MMOL/L (ref 132–146)
TOTAL PROTEIN: 6.4 G/DL (ref 6.4–8.3)
WBC # BLD: 11.2 E9/L (ref 4.5–11.5)

## 2019-07-08 PROCEDURE — 85025 COMPLETE CBC W/AUTO DIFF WBC: CPT

## 2019-07-08 PROCEDURE — 6360000002 HC RX W HCPCS: Performed by: INTERNAL MEDICINE

## 2019-07-08 PROCEDURE — 36415 COLL VENOUS BLD VENIPUNCTURE: CPT

## 2019-07-08 PROCEDURE — 83690 ASSAY OF LIPASE: CPT

## 2019-07-08 PROCEDURE — 74177 CT ABD & PELVIS W/CONTRAST: CPT

## 2019-07-08 PROCEDURE — 80053 COMPREHEN METABOLIC PANEL: CPT

## 2019-07-08 PROCEDURE — C9113 INJ PANTOPRAZOLE SODIUM, VIA: HCPCS | Performed by: INTERNAL MEDICINE

## 2019-07-08 PROCEDURE — 2700000000 HC OXYGEN THERAPY PER DAY

## 2019-07-08 PROCEDURE — 6370000000 HC RX 637 (ALT 250 FOR IP): Performed by: INTERNAL MEDICINE

## 2019-07-08 PROCEDURE — 6360000004 HC RX CONTRAST MEDICATION: Performed by: RADIOLOGY

## 2019-07-08 PROCEDURE — 2060000000 HC ICU INTERMEDIATE R&B

## 2019-07-08 RX ADMIN — ONDANSETRON 4 MG: 2 INJECTION INTRAMUSCULAR; INTRAVENOUS at 03:05

## 2019-07-08 RX ADMIN — IOPAMIDOL 80 ML: 755 INJECTION, SOLUTION INTRAVENOUS at 17:43

## 2019-07-08 RX ADMIN — PANTOPRAZOLE SODIUM 40 MG: 40 INJECTION, POWDER, FOR SOLUTION INTRAVENOUS at 08:18

## 2019-07-08 RX ADMIN — IOHEXOL 50 ML: 240 INJECTION, SOLUTION INTRATHECAL; INTRAVASCULAR; INTRAVENOUS; ORAL at 17:42

## 2019-07-08 RX ADMIN — POTASSIUM CHLORIDE AND SODIUM CHLORIDE 50 ML/HR: 900; 150 INJECTION, SOLUTION INTRAVENOUS at 09:53

## 2019-07-08 RX ADMIN — MULTIPLE VITAMINS W/ MINERALS TAB 1 TABLET: TAB at 08:18

## 2019-07-08 RX ADMIN — CARVEDILOL 25 MG: 25 TABLET, FILM COATED ORAL at 08:18

## 2019-07-08 RX ADMIN — PANTOPRAZOLE SODIUM 40 MG: 40 INJECTION, POWDER, FOR SOLUTION INTRAVENOUS at 20:53

## 2019-07-08 RX ADMIN — MAGNESIUM OXIDE TAB 400 MG (241.3 MG ELEMENTAL MG) 400 MG: 400 (241.3 MG) TAB at 08:18

## 2019-07-08 RX ADMIN — ASPIRIN 81 MG 81 MG: 81 TABLET ORAL at 08:19

## 2019-07-08 RX ADMIN — PRAVASTATIN SODIUM 80 MG: 20 TABLET ORAL at 08:18

## 2019-07-08 RX ADMIN — HYDROCODONE BITARTRATE AND ACETAMINOPHEN 1 TABLET: 7.5; 325 TABLET ORAL at 18:52

## 2019-07-08 RX ADMIN — FENOFIBRATE 160 MG: 160 TABLET ORAL at 08:18

## 2019-07-08 RX ADMIN — HYDROCODONE BITARTRATE AND ACETAMINOPHEN 1 TABLET: 7.5; 325 TABLET ORAL at 09:55

## 2019-07-08 RX ADMIN — CARVEDILOL 25 MG: 25 TABLET, FILM COATED ORAL at 16:16

## 2019-07-08 RX ADMIN — HYDROCODONE BITARTRATE AND ACETAMINOPHEN 1 TABLET: 7.5; 325 TABLET ORAL at 04:26

## 2019-07-08 RX ADMIN — ENOXAPARIN SODIUM 40 MG: 40 INJECTION SUBCUTANEOUS at 20:53

## 2019-07-08 RX ADMIN — ONDANSETRON 4 MG: 2 INJECTION INTRAMUSCULAR; INTRAVENOUS at 18:52

## 2019-07-08 ASSESSMENT — PAIN DESCRIPTION - FREQUENCY: FREQUENCY: CONTINUOUS

## 2019-07-08 ASSESSMENT — PAIN SCALES - GENERAL
PAINLEVEL_OUTOF10: 0
PAINLEVEL_OUTOF10: 9
PAINLEVEL_OUTOF10: 0
PAINLEVEL_OUTOF10: 6
PAINLEVEL_OUTOF10: 0
PAINLEVEL_OUTOF10: 8
PAINLEVEL_OUTOF10: 3
PAINLEVEL_OUTOF10: 0

## 2019-07-08 ASSESSMENT — PAIN DESCRIPTION - LOCATION: LOCATION: ABDOMEN;BACK

## 2019-07-08 ASSESSMENT — PAIN DESCRIPTION - PROGRESSION: CLINICAL_PROGRESSION: NOT CHANGED

## 2019-07-08 ASSESSMENT — PAIN - FUNCTIONAL ASSESSMENT: PAIN_FUNCTIONAL_ASSESSMENT: ACTIVITIES ARE NOT PREVENTED

## 2019-07-08 ASSESSMENT — PAIN DESCRIPTION - PAIN TYPE: TYPE: ACUTE PAIN;CHRONIC PAIN

## 2019-07-08 ASSESSMENT — PAIN DESCRIPTION - ONSET: ONSET: ON-GOING

## 2019-07-08 ASSESSMENT — PAIN DESCRIPTION - DESCRIPTORS: DESCRIPTORS: ACHING;SHARP;DISCOMFORT

## 2019-07-08 NOTE — PROGRESS NOTES
Patient seen and examined. Patient still complaining of epigastric pain with radiation to the back which is constant and is worse with solid meals. Patient states that the meals cause an increase in nausea. MRCP results reviewed. Patient's family is not at the bedside. No complaints of unusual SOB, nausea, vomiting, chest pain,abd. pain, dizziness, diarrhea or constipation. /72   Pulse 66   Temp 98 °F (36.7 °C) (Oral)   Resp 18   Ht 5' 11\" (1.803 m)   Wt 210 lb (95.3 kg)   SpO2 93%   BMI 29.29 kg/m²     HEENT: negative to gross visual examination  Heart: regular rate and rhythm  Lungs: Coarse decreased breath sounds  Abdomen: soft, positive bowel sounds,+ obese,+ abdominal distention,+ tenderness palpation epigastric area, no hepatosplenomegaly, no guarding, rebound, rigidity  Ext: no clubbing, cyanosis, erythema, edema  Neuro: alert and oriented.  No gross deficits    CBC with Differential:    Lab Results   Component Value Date    WBC 11.2 07/08/2019    RBC 3.71 07/08/2019    HGB 10.3 07/08/2019    HCT 33.5 07/08/2019     07/08/2019    MCV 90.3 07/08/2019    MCH 27.8 07/08/2019    MCHC 30.7 07/08/2019    RDW 14.7 07/08/2019    LYMPHOPCT 10.3 07/08/2019    MONOPCT 8.6 07/08/2019    MYELOPCT 1.0 07/03/2019    BASOPCT 0.4 07/08/2019    MONOSABS 0.96 07/08/2019    LYMPHSABS 1.15 07/08/2019    EOSABS 0.42 07/08/2019    BASOSABS 0.05 07/08/2019     CMP:    Lab Results   Component Value Date     07/08/2019    K 4.2 07/08/2019    K 3.6 07/01/2019    CL 98 07/08/2019    CO2 29 07/08/2019    BUN 4 07/08/2019    CREATININE 0.8 07/08/2019    GFRAA >60 07/08/2019    LABGLOM >60 07/08/2019    GLUCOSE 110 07/08/2019    PROT 6.4 07/08/2019    LABALBU 2.9 07/08/2019    CALCIUM 9.3 07/08/2019    BILITOT 0.3 07/08/2019    ALKPHOS 76 07/08/2019    AST 27 07/08/2019    ALT 17 07/08/2019     Magnesium:    Lab Results   Component Value Date    MG 1.7 07/02/2019     Phosphorus:    Lab Results   Component

## 2019-07-09 LAB — LIPASE: 174 U/L (ref 13–60)

## 2019-07-09 PROCEDURE — 6360000002 HC RX W HCPCS: Performed by: INTERNAL MEDICINE

## 2019-07-09 PROCEDURE — 6370000000 HC RX 637 (ALT 250 FOR IP): Performed by: INTERNAL MEDICINE

## 2019-07-09 PROCEDURE — 36415 COLL VENOUS BLD VENIPUNCTURE: CPT

## 2019-07-09 PROCEDURE — C9113 INJ PANTOPRAZOLE SODIUM, VIA: HCPCS | Performed by: INTERNAL MEDICINE

## 2019-07-09 PROCEDURE — 2060000000 HC ICU INTERMEDIATE R&B

## 2019-07-09 PROCEDURE — 2700000000 HC OXYGEN THERAPY PER DAY

## 2019-07-09 PROCEDURE — 86038 ANTINUCLEAR ANTIBODIES: CPT

## 2019-07-09 PROCEDURE — 83690 ASSAY OF LIPASE: CPT

## 2019-07-09 RX ADMIN — ENOXAPARIN SODIUM 40 MG: 40 INJECTION SUBCUTANEOUS at 08:30

## 2019-07-09 RX ADMIN — ASPIRIN 81 MG 81 MG: 81 TABLET ORAL at 08:30

## 2019-07-09 RX ADMIN — MAGNESIUM OXIDE TAB 400 MG (241.3 MG ELEMENTAL MG) 400 MG: 400 (241.3 MG) TAB at 08:30

## 2019-07-09 RX ADMIN — HYDROCODONE BITARTRATE AND ACETAMINOPHEN 1 TABLET: 7.5; 325 TABLET ORAL at 06:53

## 2019-07-09 RX ADMIN — ONDANSETRON 4 MG: 2 INJECTION INTRAMUSCULAR; INTRAVENOUS at 00:53

## 2019-07-09 RX ADMIN — ONDANSETRON 4 MG: 2 INJECTION INTRAMUSCULAR; INTRAVENOUS at 06:53

## 2019-07-09 RX ADMIN — CARVEDILOL 25 MG: 25 TABLET, FILM COATED ORAL at 08:36

## 2019-07-09 RX ADMIN — HYDROCODONE BITARTRATE AND ACETAMINOPHEN 1 TABLET: 7.5; 325 TABLET ORAL at 18:54

## 2019-07-09 RX ADMIN — POTASSIUM CHLORIDE AND SODIUM CHLORIDE: 900; 150 INJECTION, SOLUTION INTRAVENOUS at 06:53

## 2019-07-09 RX ADMIN — CARVEDILOL 25 MG: 25 TABLET, FILM COATED ORAL at 16:45

## 2019-07-09 RX ADMIN — ONDANSETRON 4 MG: 2 INJECTION INTRAMUSCULAR; INTRAVENOUS at 18:57

## 2019-07-09 RX ADMIN — ENOXAPARIN SODIUM 40 MG: 40 INJECTION SUBCUTANEOUS at 10:02

## 2019-07-09 RX ADMIN — ENOXAPARIN SODIUM 90 MG: 100 INJECTION SUBCUTANEOUS at 20:35

## 2019-07-09 RX ADMIN — HYDROCODONE BITARTRATE AND ACETAMINOPHEN 1 TABLET: 7.5; 325 TABLET ORAL at 12:53

## 2019-07-09 RX ADMIN — PANTOPRAZOLE SODIUM 40 MG: 40 INJECTION, POWDER, FOR SOLUTION INTRAVENOUS at 08:29

## 2019-07-09 RX ADMIN — MULTIPLE VITAMINS W/ MINERALS TAB 1 TABLET: TAB at 08:30

## 2019-07-09 RX ADMIN — FENOFIBRATE 160 MG: 160 TABLET ORAL at 08:30

## 2019-07-09 RX ADMIN — HYDROCODONE BITARTRATE AND ACETAMINOPHEN 1 TABLET: 7.5; 325 TABLET ORAL at 00:53

## 2019-07-09 ASSESSMENT — PAIN DESCRIPTION - LOCATION
LOCATION: ABDOMEN
LOCATION: ABDOMEN;BACK
LOCATION: ABDOMEN;BACK

## 2019-07-09 ASSESSMENT — PAIN SCALES - GENERAL
PAINLEVEL_OUTOF10: 6
PAINLEVEL_OUTOF10: 10
PAINLEVEL_OUTOF10: 8
PAINLEVEL_OUTOF10: 10
PAINLEVEL_OUTOF10: 8
PAINLEVEL_OUTOF10: 0
PAINLEVEL_OUTOF10: 1
PAINLEVEL_OUTOF10: 3

## 2019-07-09 ASSESSMENT — PAIN DESCRIPTION - PROGRESSION
CLINICAL_PROGRESSION: NOT CHANGED

## 2019-07-09 ASSESSMENT — PAIN - FUNCTIONAL ASSESSMENT
PAIN_FUNCTIONAL_ASSESSMENT: ACTIVITIES ARE NOT PREVENTED

## 2019-07-09 ASSESSMENT — PAIN DESCRIPTION - DESCRIPTORS
DESCRIPTORS: ACHING;DISCOMFORT;SHARP

## 2019-07-09 ASSESSMENT — PAIN DESCRIPTION - FREQUENCY
FREQUENCY: CONTINUOUS

## 2019-07-09 ASSESSMENT — PAIN DESCRIPTION - ONSET
ONSET: ON-GOING

## 2019-07-09 ASSESSMENT — PAIN DESCRIPTION - PAIN TYPE
TYPE: ACUTE PAIN

## 2019-07-09 NOTE — PROGRESS NOTES
Patient seen and examined. Patient has less epigastric pain today but has been on clear liquid diet. Repeat CT of the abdomen showed splenic thrombus. MRCP results reviewed. Patient's family is at the bedside Case discussed. No complaints of unusual SOB, nausea, vomiting, chest pain, dizziness, diarrhea or constipation. /63   Pulse 67   Temp 97.6 °F (36.4 °C) (Oral)   Resp 19   Ht 5' 11\" (1.803 m)   Wt 210 lb (95.3 kg)   SpO2 96%   BMI 29.29 kg/m²     HEENT: negative to gross visual examination  Heart: regular rate and rhythm  Lungs: Coarse decreased breath sounds  Abdomen: soft, positive bowel sounds,+ obese,+ abdominal distention,+ tenderness palpation epigastric area, no hepatosplenomegaly, no guarding, rebound, rigidity  Ext: no clubbing, cyanosis, erythema, edema  Neuro: alert and oriented.  No gross deficits    CBC with Differential:    Lab Results   Component Value Date    WBC 11.2 07/08/2019    RBC 3.71 07/08/2019    HGB 10.3 07/08/2019    HCT 33.5 07/08/2019     07/08/2019    MCV 90.3 07/08/2019    MCH 27.8 07/08/2019    MCHC 30.7 07/08/2019    RDW 14.7 07/08/2019    LYMPHOPCT 10.3 07/08/2019    MONOPCT 8.6 07/08/2019    MYELOPCT 1.0 07/03/2019    BASOPCT 0.4 07/08/2019    MONOSABS 0.96 07/08/2019    LYMPHSABS 1.15 07/08/2019    EOSABS 0.42 07/08/2019    BASOSABS 0.05 07/08/2019     CMP:    Lab Results   Component Value Date     07/08/2019    K 4.2 07/08/2019    K 3.6 07/01/2019    CL 98 07/08/2019    CO2 29 07/08/2019    BUN 4 07/08/2019    CREATININE 0.8 07/08/2019    GFRAA >60 07/08/2019    LABGLOM >60 07/08/2019    GLUCOSE 110 07/08/2019    PROT 6.4 07/08/2019    LABALBU 2.9 07/08/2019    CALCIUM 9.3 07/08/2019    BILITOT 0.3 07/08/2019    ALKPHOS 76 07/08/2019    AST 27 07/08/2019    ALT 17 07/08/2019     Magnesium:    Lab Results   Component Value Date    MG 1.7 07/02/2019     Phosphorus:    Lab Results   Component Value Date    PHOS 2.5 07/02/2019     PT/INR:  No

## 2019-07-09 NOTE — CONSULTS
during this hospital admission patient was noted to be anemic with stable hemoglobin since admission and today is 10.3 g/dL. MCV is 90.3 with MCH of 27.8. RDW is also nonelevated at 14.7. Fasting lipid profile appears to beunremarkable.     Information sources:   -Patient  -medical record  -health care team    PMHx:  Past Medical History:   Diagnosis Date    Arthritis     CAD (coronary artery disease)     Cancer ([facility] [address]     basal cell chest and face    COPD (chronic obstructive pulmonary disease) ([facility] [address]     Hyperlipidemia     Hypertension        PSHx:  Past Surgical History:   Procedure Laterality Date    APPENDECTOMY      CATARACT REMOVAL WITH IMPLANT Left 1 21 16    CATARACT REMOVAL WITH IMPLANT Right 02/18/16    CHOLECYSTECTOMY      CORONARY ANGIOPLASTY WITH STENT PLACEMENT      1 stent       Meds:  Current Facility-Administered Medications   Medication Dose Route Frequency Provider Last Rate Last Dose    enoxaparin (LOVENOX) injection 90 mg  90 mg Subcutaneous BID Magdalen Pepito, DO        traMADol (ULTRAM) tablet 50 mg  50 mg Oral Q6H PRN Magdalen Pepito, DO        Or    HYDROcodone-acetaminophen (NORCO) 7.5-325 MG per tablet 1 tablet  1 tablet Oral Q6H PRN Magdalen Pepito, DO   1 tablet at 07/09/19 0653    mometasone-formoterol (DULERA) 200-5 MCG/ACT inhaler 2 puff  2 puff Inhalation BID Luna Lance DO   2 puff at 07/05/19 0612    aspirin chewable tablet 81 mg  81 mg Oral Daily Gen A Hensley, DO   81 mg at 07/09/19 0830    carvedilol (COREG) tablet 25 mg  25 mg Oral BID  Gen A Hensley, DO   25 mg at 07/09/19 0836    cholestyramine light packet 4 g  4 g Oral Daily Magdalen Pepito, DO   Stopped at 07/07/19 1027    fenofibrate tablet 160 mg  160 mg Oral Daily Magdalen Pepito, DO   160 mg at 07/09/19 0830    magnesium oxide (MAG-OX) tablet 400 mg  400 mg Oral Daily Magdalen Pepito, DO   400 mg at 07/09/19 0830    therapeutic multivitamin-minerals 1 tablet  1 tablet Oral

## 2019-07-09 NOTE — CARE COORDINATION
Spoke with Kellie Gong @ [facility], she has clinical information and will call the [address] [facility] floor when bed available. Dr. Hawk Larry aware transfer may be possible if bed becomes available. Charge nurse aware.

## 2019-07-10 VITALS
RESPIRATION RATE: 18 BRPM | HEART RATE: 70 BPM | SYSTOLIC BLOOD PRESSURE: 117 MMHG | OXYGEN SATURATION: 93 % | BODY MASS INDEX: 29.4 KG/M2 | DIASTOLIC BLOOD PRESSURE: 76 MMHG | HEIGHT: 71 IN | TEMPERATURE: 98.2 F | WEIGHT: 210 LBS

## 2019-07-10 LAB
ANTI-NUCLEAR ANTIBODY (ANA): NEGATIVE
BASOPHILS ABSOLUTE: 0.08 E9/L (ref 0–0.2)
BASOPHILS RELATIVE PERCENT: 0.8 % (ref 0–2)
EOSINOPHILS ABSOLUTE: 0.54 E9/L (ref 0.05–0.5)
EOSINOPHILS RELATIVE PERCENT: 5.3 % (ref 0–6)
FERRITIN: 388 NG/ML
FOLATE: 11.3 NG/ML (ref 4.8–24.2)
HCT VFR BLD CALC: 36.9 % (ref 37–54)
HEMOGLOBIN: 11.2 G/DL (ref 12.5–16.5)
IMMATURE GRANULOCYTES #: 0.34 E9/L
IMMATURE GRANULOCYTES %: 3.3 % (ref 0–5)
IRON SATURATION: 13 % (ref 20–55)
IRON: 38 MCG/DL (ref 59–158)
LIPASE: 175 U/L (ref 13–60)
LYMPHOCYTES ABSOLUTE: 1.52 E9/L (ref 1.5–4)
LYMPHOCYTES RELATIVE PERCENT: 14.9 % (ref 20–42)
MCH RBC QN AUTO: 27.6 PG (ref 26–35)
MCHC RBC AUTO-ENTMCNC: 30.4 % (ref 32–34.5)
MCV RBC AUTO: 90.9 FL (ref 80–99.9)
MONOCYTES ABSOLUTE: 0.81 E9/L (ref 0.1–0.95)
MONOCYTES RELATIVE PERCENT: 7.9 % (ref 2–12)
NEUTROPHILS ABSOLUTE: 6.93 E9/L (ref 1.8–7.3)
NEUTROPHILS RELATIVE PERCENT: 67.8 % (ref 43–80)
PDW BLD-RTO: 14.6 FL (ref 11.5–15)
PLATELET # BLD: 455 E9/L (ref 130–450)
PMV BLD AUTO: 9.5 FL (ref 7–12)
RBC # BLD: 4.06 E12/L (ref 3.8–5.8)
TOTAL IRON BINDING CAPACITY: 303 MCG/DL (ref 250–450)
VITAMIN B-12: 1265 PG/ML (ref 211–946)
WBC # BLD: 10.2 E9/L (ref 4.5–11.5)

## 2019-07-10 PROCEDURE — 6360000002 HC RX W HCPCS: Performed by: INTERNAL MEDICINE

## 2019-07-10 PROCEDURE — 82607 VITAMIN B-12: CPT

## 2019-07-10 PROCEDURE — 6370000000 HC RX 637 (ALT 250 FOR IP): Performed by: INTERNAL MEDICINE

## 2019-07-10 PROCEDURE — 82746 ASSAY OF FOLIC ACID SERUM: CPT

## 2019-07-10 PROCEDURE — 2700000000 HC OXYGEN THERAPY PER DAY

## 2019-07-10 PROCEDURE — 83550 IRON BINDING TEST: CPT

## 2019-07-10 PROCEDURE — 82728 ASSAY OF FERRITIN: CPT

## 2019-07-10 PROCEDURE — 83690 ASSAY OF LIPASE: CPT

## 2019-07-10 PROCEDURE — 85025 COMPLETE CBC W/AUTO DIFF WBC: CPT

## 2019-07-10 PROCEDURE — 83540 ASSAY OF IRON: CPT

## 2019-07-10 PROCEDURE — 36415 COLL VENOUS BLD VENIPUNCTURE: CPT

## 2019-07-10 RX ORDER — MORPHINE SULFATE 4 MG/ML
4 INJECTION, SOLUTION INTRAMUSCULAR; INTRAVENOUS EVERY 4 HOURS PRN
Refills: 0 | DISCHARGE
Start: 2019-07-10 | End: 2019-07-13

## 2019-07-10 RX ORDER — IPRATROPIUM BROMIDE AND ALBUTEROL SULFATE 2.5; .5 MG/3ML; MG/3ML
3 SOLUTION RESPIRATORY (INHALATION)
Qty: 360 ML | DISCHARGE
Start: 2019-07-10

## 2019-07-10 RX ADMIN — FENOFIBRATE 160 MG: 160 TABLET ORAL at 09:10

## 2019-07-10 RX ADMIN — HYDROCODONE BITARTRATE AND ACETAMINOPHEN 1 TABLET: 7.5; 325 TABLET ORAL at 07:35

## 2019-07-10 RX ADMIN — MULTIPLE VITAMINS W/ MINERALS TAB 1 TABLET: TAB at 09:10

## 2019-07-10 RX ADMIN — MORPHINE SULFATE 4 MG: 4 INJECTION INTRAVENOUS at 09:10

## 2019-07-10 RX ADMIN — ENOXAPARIN SODIUM 90 MG: 100 INJECTION SUBCUTANEOUS at 09:10

## 2019-07-10 RX ADMIN — POTASSIUM CHLORIDE AND SODIUM CHLORIDE: 900; 150 INJECTION, SOLUTION INTRAVENOUS at 03:50

## 2019-07-10 RX ADMIN — ASPIRIN 81 MG 81 MG: 81 TABLET ORAL at 09:10

## 2019-07-10 RX ADMIN — ONDANSETRON 4 MG: 2 INJECTION INTRAMUSCULAR; INTRAVENOUS at 01:14

## 2019-07-10 RX ADMIN — MORPHINE SULFATE 4 MG: 4 INJECTION INTRAVENOUS at 02:13

## 2019-07-10 RX ADMIN — HYDROCODONE BITARTRATE AND ACETAMINOPHEN 1 TABLET: 7.5; 325 TABLET ORAL at 01:14

## 2019-07-10 RX ADMIN — MAGNESIUM OXIDE TAB 400 MG (241.3 MG ELEMENTAL MG) 400 MG: 400 (241.3 MG) TAB at 09:10

## 2019-07-10 RX ADMIN — MORPHINE SULFATE 4 MG: 4 INJECTION INTRAVENOUS at 14:21

## 2019-07-10 RX ADMIN — CARVEDILOL 25 MG: 25 TABLET, FILM COATED ORAL at 07:35

## 2019-07-10 RX ADMIN — HYDROCODONE BITARTRATE AND ACETAMINOPHEN 1 TABLET: 7.5; 325 TABLET ORAL at 13:18

## 2019-07-10 ASSESSMENT — PAIN DESCRIPTION - ONSET
ONSET: ON-GOING
ONSET: ON-GOING

## 2019-07-10 ASSESSMENT — PAIN DESCRIPTION - LOCATION
LOCATION: ABDOMEN
LOCATION: ABDOMEN;HEAD

## 2019-07-10 ASSESSMENT — PAIN SCALES - GENERAL
PAINLEVEL_OUTOF10: 8
PAINLEVEL_OUTOF10: 6
PAINLEVEL_OUTOF10: 8
PAINLEVEL_OUTOF10: 8
PAINLEVEL_OUTOF10: 9

## 2019-07-10 ASSESSMENT — PAIN DESCRIPTION - PAIN TYPE
TYPE: ACUTE PAIN
TYPE: ACUTE PAIN

## 2019-07-10 ASSESSMENT — PAIN DESCRIPTION - PROGRESSION
CLINICAL_PROGRESSION: NOT CHANGED
CLINICAL_PROGRESSION: NOT CHANGED

## 2019-07-10 ASSESSMENT — PAIN DESCRIPTION - DESCRIPTORS
DESCRIPTORS: ACHING;DISCOMFORT;SHARP
DESCRIPTORS: ACHING;DISCOMFORT;HEADACHE

## 2019-07-10 ASSESSMENT — PAIN DESCRIPTION - FREQUENCY
FREQUENCY: CONTINUOUS
FREQUENCY: CONTINUOUS

## 2019-07-10 NOTE — PLAN OF CARE
Problem: Falls - Risk of:  Goal: Will remain free from falls  Description  Will remain free from falls  Outcome: Met This Shift     Problem: Falls - Risk of:  Goal: Absence of physical injury  Description  Absence of physical injury  Outcome: Met This Shift
Problem: Falls - Risk of:  Goal: Will remain free from falls  Description  Will remain free from falls  Outcome: Met This Shift     Problem: Falls - Risk of:  Goal: Absence of physical injury  Description  Absence of physical injury  Outcome: Met This Shift     Problem: Bowel/Gastric:  Goal: Bowel function will improve  Description  Bowel function will improve  Outcome: Met This Shift     Problem: Bowel/Gastric:  Goal: Occurrences of nausea will decrease  Description  Occurrences of nausea will decrease  Outcome: Met This Shift     Problem:  Bowel/Gastric:  Goal: Occurrences of vomiting will decrease  Description  Occurrences of vomiting will decrease  Outcome: Met This Shift     Problem: Fluid Volume:  Goal: Maintenance of adequate hydration will improve  Description  Maintenance of adequate hydration will improve  Outcome: Met This Shift     Problem: Pain:  Goal: Pain level will decrease  Description  Pain level will decrease  Outcome: Met This Shift     Problem: Pain:  Goal: Control of acute pain  Description  Control of acute pain  Outcome: Met This Shift     Problem: Pain:  Goal: Control of chronic pain  Description  Control of chronic pain  Outcome: Met This Shift
Problem: Inadequate energy intake (NI-1.4)  Goal: Food and/or Nutrient Delivery  Description  Individualized approach for food/nutrient provision.   Outcome: Met This Shift
acute pain  7/10/2019 1126 by Piter Rome RN  Outcome: Met This Shift  7/10/2019 0138 by Crista Ragsdale RN  Outcome: Met This Shift  Goal: Control of chronic pain  Description  Control of chronic pain  Outcome: Met This Shift

## 2019-07-10 NOTE — PROGRESS NOTES
PROGRESS NOTE  By Yanna Grant M.D. [facility]  Dr. Jacky Mabry M.D.,  Dr. Angelina Hernandez M.D.,   Dr. Kristy Stockton D.O.,  Dr. Anupama Wilkes M.D.,  Dr Colletta Screen, D.O. Farida Block  76 y.o.  male    SUBJECTIVE:  Persistent epigastric abdominal pain with radiation to the back    OBJECTIVE:    /76   Pulse 70   Temp 98.2 °F (36.8 °C) (Oral)   Resp 18   Ht 5' 11\" (1.803 m)   Wt 210 lb (95.3 kg)   SpO2 93%   BMI 29.29 kg/m²     General: NAD/ male   HEENT:anicteric sclera/moist oromucosa  Neck: supple/trachea midline   Chest: Symmetrical excursion/nonlabored respirations  Abd.: Soft/obese. Appears tender in the epigastrium. No rebound tenderness or guarding  Extr.:  No peripheral edema  Skin: Warm and dry. Anicteric        DATA:       Lab Results   Component Value Date    WBC 10.2 07/10/2019    RBC 4.06 07/10/2019    HGB 11.2 07/10/2019    HCT 36.9 07/10/2019    MCV 90.9 07/10/2019    MCH 27.6 07/10/2019    MCHC 30.4 07/10/2019    RDW 14.6 07/10/2019     07/10/2019    MPV 9.5 07/10/2019     Lab Results   Component Value Date     07/08/2019    K 4.2 07/08/2019    K 3.6 07/01/2019    CL 98 07/08/2019    CO2 29 07/08/2019    BUN 4 07/08/2019    CREATININE 0.8 07/08/2019    CALCIUM 9.3 07/08/2019    PROT 6.4 07/08/2019    LABALBU 2.9 07/08/2019    BILITOT 0.3 07/08/2019    ALKPHOS 76 07/08/2019    AST 27 07/08/2019    ALT 17 07/08/2019     Lab Results   Component Value Date    LIPASE 175 07/10/2019     No results found for: AMYLASE      ASSESSMENT/PLAN:    1. Pancreatitis  -Acute/questionable etiology  -Status post cholecystectomy without evidence of biliary obstruction  -pancreatic necrosis CT 7/8  -Appreciate input from hepatobiliary surgery - at tertiary care center -patient reports that his preference for tertiary care center will be [facility] facility in [place] -pending bed availability     2.   Anemia  -Acute without clear evidence of overt bleed  -Possibly dilutional component  -Patient reports unremarkable endoscopies in the past  -Check FOBT      NOTE:  This report was transcribed using voice recognition software. Every effort was made to ensure accuracy; however, inadvertent computerized transcription errors may be present.     Mark Anthony French MD  7/10/2019  10:41 AM

## 2019-07-10 NOTE — DISCHARGE SUMMARY
Janel Escoto, UROBILINOGEN, BILIRUBINUR, BLOODU, GLUCOSEU, AMORPHOUS  HgBA1c:  No results found for: LABA1C  FLP:    Lab Results   Component Value Date    TRIG 116 07/02/2019    HDL 25 07/02/2019    LDLCALC 72 07/02/2019    LABVLDL 23 07/02/2019     TSH:    Lab Results   Component Value Date    TSH 1.830 07/02/2019     LIPASE:    Lab Results   Component Value Date    LIPASE 175 07/10/2019       No results for input(s): GLUMET in the last 72 hours. CT ABDOMEN PELVIS W IV CONTRAST Additional Contrast? Oral   Final Result   1. Redemonstration of changes of acute pancreatitis with questionable   area of hypoenhancement in the region of the pancreatic head, possibly   related to necrosis. No loculated collection is identified. 2. New nonocclusive thrombus within the splenic vein. 3. New trace left pleural effusion with mild dependent changes within   bilateral lower lobes. XR CHEST STANDARD (2 VW)   Final Result   Mild cardiomegaly with suggestion of pulmonary vascular congestion and   perihilar airspace disease, likely edema versus pneumonia. MRI Abdomen W WO Contrast   Final Result   1. Redemonstration of changes of acute pancreatitis without evidence   of necrosis or loculated collection. 2. Post cholecystectomy with normal caliber bile ducts. 3. Mild wall thickening along the descending portion of the duodenum,   likely reactive. CT ABDOMEN PELVIS W IV CONTRAST Additional Contrast? Oral   Final Result   Extensive inflammatory process in the upper abdomen. Differential includes pancreatitis and duodenitis. Air bubbles within   the region of the abnormal appearing duodenum could be intraluminal or   intramural in location. Inflammatory process is also associated with   some mural thickening of the ascending portion of the colon.            enoxaparin  90 mg Subcutaneous BID    mometasone-formoterol  2 puff Inhalation BID    aspirin  81 mg Oral Daily    carvedilol  25 mg Oral BID WC    cholestyramine light  4 g Oral Daily    fenofibrate  160 mg Oral Daily    magnesium oxide  400 mg Oral Daily    therapeutic multivitamin-minerals  1 tablet Oral Daily    traMADol  50 mg Oral BID    ipratropium-albuterol  1 ampule Inhalation Q4H WA        Assessment;   Active Problems:    Acute pancreatitis-with development of necrosis in the head of the pancreas-exact etiology of pancreatitis unknown    Chronic hypoxic respiratory failure with patient normally on 2 L at rest and 8 L with activity-currently on 4 L nasal cannula    Hypertension-104//65    Hyperlipidemia    COPD (chronic obstructive pulmonary disease) (HCC)    Constipation-resolved    Nonocclusive acute splenic veinthrombus      Plan:   Discharge to [place] on current treatment plan      Ok Jaime DO  10:50 AM  7/10/2019

## 2019-08-06 ENCOUNTER — HOSPITAL ENCOUNTER (OUTPATIENT)
Dept: CARDIAC REHAB | Age: 69
Setting detail: THERAPIES SERIES
Discharge: HOME | End: 2019-08-06
Payer: COMMERCIAL

## 2019-08-06 PROCEDURE — G0424 PULMONARY REHAB W EXER: HCPCS

## 2019-08-08 ENCOUNTER — HOSPITAL ENCOUNTER (OUTPATIENT)
Dept: CARDIAC REHAB | Age: 69
Setting detail: THERAPIES SERIES
Discharge: HOME | End: 2019-08-08
Payer: COMMERCIAL

## 2019-08-08 PROCEDURE — G0424 PULMONARY REHAB W EXER: HCPCS

## 2019-08-13 ENCOUNTER — HOSPITAL ENCOUNTER (OUTPATIENT)
Dept: CARDIAC REHAB | Age: 69
Setting detail: THERAPIES SERIES
Discharge: HOME | End: 2019-08-13
Payer: COMMERCIAL

## 2019-08-13 PROCEDURE — G0424 PULMONARY REHAB W EXER: HCPCS

## 2019-09-03 ENCOUNTER — HOSPITAL ENCOUNTER (OUTPATIENT)
Dept: CARDIAC REHAB | Age: 69
Setting detail: THERAPIES SERIES
Discharge: HOME | End: 2019-09-03
Payer: COMMERCIAL

## 2019-09-03 PROCEDURE — G0424 PULMONARY REHAB W EXER: HCPCS

## 2019-09-10 ENCOUNTER — HOSPITAL ENCOUNTER (OUTPATIENT)
Dept: CARDIAC REHAB | Age: 69
Setting detail: THERAPIES SERIES
Discharge: HOME | End: 2019-09-10
Payer: COMMERCIAL

## 2019-09-10 PROCEDURE — G0424 PULMONARY REHAB W EXER: HCPCS
